# Patient Record
Sex: MALE | Race: WHITE | NOT HISPANIC OR LATINO | Employment: FULL TIME | URBAN - NONMETROPOLITAN AREA
[De-identification: names, ages, dates, MRNs, and addresses within clinical notes are randomized per-mention and may not be internally consistent; named-entity substitution may affect disease eponyms.]

---

## 2019-10-16 ENCOUNTER — TELEPHONE (OUTPATIENT)
Dept: FAMILY MEDICINE CLINIC | Facility: CLINIC | Age: 59
End: 2019-10-16

## 2019-10-16 NOTE — TELEPHONE ENCOUNTER
Called patient to move appt  Dr Gabby Dimas will not be in the office in the afternoon on 10/23  Phone is not in service

## 2019-11-08 DIAGNOSIS — E03.9 ACQUIRED HYPOTHYROIDISM: Primary | ICD-10-CM

## 2019-11-12 RX ORDER — LEVOTHYROXINE SODIUM 0.15 MG/1
TABLET ORAL
Qty: 90 TABLET | Refills: 2 | Status: SHIPPED | OUTPATIENT
Start: 2019-11-12 | End: 2020-07-21

## 2019-11-20 ENCOUNTER — OFFICE VISIT (OUTPATIENT)
Dept: FAMILY MEDICINE CLINIC | Facility: CLINIC | Age: 59
End: 2019-11-20
Payer: COMMERCIAL

## 2019-11-20 VITALS
DIASTOLIC BLOOD PRESSURE: 82 MMHG | BODY MASS INDEX: 33.63 KG/M2 | HEIGHT: 72 IN | OXYGEN SATURATION: 98 % | WEIGHT: 248.3 LBS | HEART RATE: 52 BPM | SYSTOLIC BLOOD PRESSURE: 138 MMHG

## 2019-11-20 DIAGNOSIS — Z12.5 PROSTATE CANCER SCREENING: ICD-10-CM

## 2019-11-20 DIAGNOSIS — E78.5 DYSLIPIDEMIA: ICD-10-CM

## 2019-11-20 DIAGNOSIS — Z79.899 ENCOUNTER FOR LONG-TERM (CURRENT) USE OF OTHER MEDICATIONS: ICD-10-CM

## 2019-11-20 DIAGNOSIS — I10 ESSENTIAL HYPERTENSION, BENIGN: ICD-10-CM

## 2019-11-20 DIAGNOSIS — Z11.59 ENCOUNTER FOR HEPATITIS C SCREENING TEST FOR LOW RISK PATIENT: ICD-10-CM

## 2019-11-20 DIAGNOSIS — E03.9 ACQUIRED HYPOTHYROIDISM: Primary | ICD-10-CM

## 2019-11-20 PROCEDURE — 1036F TOBACCO NON-USER: CPT | Performed by: FAMILY MEDICINE

## 2019-11-20 PROCEDURE — 99213 OFFICE O/P EST LOW 20 MIN: CPT | Performed by: FAMILY MEDICINE

## 2019-11-20 RX ORDER — ATENOLOL 50 MG/1
1 TABLET ORAL DAILY
COMMUNITY
Start: 2019-10-17 | End: 2020-03-06 | Stop reason: SDUPTHER

## 2019-11-20 NOTE — PROGRESS NOTES
Assessment/Plan:    Acquired hypothyroidism  Continue levothyroxine 150 mcg daily  I ordered repeat TSH and T4 to be done 1 week prior to his next visit  I will see the patient back again in 6 months  Essential hypertension, benign  Blood pressure is reasonably well controlled on his current regiment  I asked the patient to try to lose weight  I asked him to follow a low-salt diet  I again asked him to resume his exercise program   He reports no side effects from the atenolol  Prior to that, he had been taking Bystolic but his insurance company would no longer cover that  Diagnoses and all orders for this visit:    Acquired hypothyroidism  -     TSH, 3rd generation; Future  -     T4, free; Future    Essential hypertension, benign  -     Comprehensive metabolic panel; Future    Encounter for hepatitis C screening test for low risk patient  -     Hepatitis C antibody; Future    Prostate cancer screening  -     PSA Total, Diagnostic; Future    Dyslipidemia  -     Lipid panel; Future    Encounter for long-term (current) use of other medications  -     CBC and differential; Future    Other orders  -     atenolol (TENORMIN) 50 mg tablet; Take 1 tablet by mouth daily        BMI Counseling: Body mass index is 33 68 kg/m²  The BMI is above normal  Nutrition recommendations include decreasing portion sizes, encouraging healthy choices of fruits and vegetables, decreasing fast food intake, consuming healthier snacks, limiting drinks that contain sugar and moderation in carbohydrate intake  Exercise recommendations include exercising 3-5 times per week  No pharmacotherapy was ordered  Subjective:      Patient ID: Sonya Cobian is a 66 y o  male  This patient is a 42-year-old white male presents to the office today for his routine checkup  The patient is doing well and has no complaints  He is taking his medication as prescribed  He had been exercising regularly up until recently    He tells me the room with his exercise equipment was just re-done  He plans to resume his exercise program   He struggles with his weight  He thinks he gained a few lb since his last visit  The following portions of the patient's history were reviewed and updated as appropriate: allergies, current medications, past family history, past medical history, past social history, past surgical history and problem list     Review of Systems   Cardiovascular: Negative for chest pain, palpitations and leg swelling  Gastrointestinal: Negative for abdominal distention, blood in stool, constipation, diarrhea, nausea and vomiting  Genitourinary:        Denies nocturia and weak urinary stream         Objective:      /82 (BP Location: Left arm, Patient Position: Sitting, Cuff Size: Large)   Pulse (!) 52   Ht 6' (1 829 m)   Wt 113 kg (248 lb 4 8 oz)   SpO2 98%   BMI 33 68 kg/m²          Physical Exam   Constitutional:   Patient is a pleasant 72-year-old white male who appears his stated age  He is in no distress  HENT:   Head: Normocephalic and atraumatic  Right Ear: External ear normal    Left Ear: External ear normal    Mouth/Throat: Oropharynx is clear and moist  No oropharyngeal exudate  Tympanic membranes are clear   Eyes: Pupils are equal, round, and reactive to light  Conjunctivae are normal  No scleral icterus  Neck: Neck supple  No tracheal deviation present  No thyromegaly present  There are no carotid bruits noted   Cardiovascular: Normal rate, regular rhythm and normal heart sounds  Exam reveals no gallop and no friction rub  No murmur heard  Pulmonary/Chest: Effort normal and breath sounds normal  No stridor  No respiratory distress  He has no wheezes  He has no rales  Abdominal: Soft  Bowel sounds are normal  He exhibits no distension and no mass  There is no tenderness  There is no rebound and no guarding     There is no organomegaly noted   Lymphadenopathy:     He has no cervical adenopathy  Vitals reviewed      extremities:  Without cyanosis, clubbing, or edema

## 2019-11-21 NOTE — ASSESSMENT & PLAN NOTE
Blood pressure is reasonably well controlled on his current regiment  I asked the patient to try to lose weight  I asked him to follow a low-salt diet  I again asked him to resume his exercise program   He reports no side effects from the atenolol  Prior to that, he had been taking Bystolic but his insurance company would no longer cover that

## 2019-11-21 NOTE — ASSESSMENT & PLAN NOTE
Continue levothyroxine 150 mcg daily  I ordered repeat TSH and T4 to be done 1 week prior to his next visit  I will see the patient back again in 6 months

## 2020-03-06 DIAGNOSIS — I10 ESSENTIAL HYPERTENSION, BENIGN: Primary | ICD-10-CM

## 2020-03-06 RX ORDER — ATENOLOL 50 MG/1
50 TABLET ORAL DAILY
Qty: 90 TABLET | Refills: 3 | Status: SHIPPED | OUTPATIENT
Start: 2020-03-06 | End: 2021-03-17

## 2020-05-18 LAB — HCV AB SER-ACNC: NEGATIVE

## 2020-05-27 ENCOUNTER — OFFICE VISIT (OUTPATIENT)
Dept: FAMILY MEDICINE CLINIC | Facility: CLINIC | Age: 60
End: 2020-05-27
Payer: COMMERCIAL

## 2020-05-27 VITALS
TEMPERATURE: 100.1 F | DIASTOLIC BLOOD PRESSURE: 90 MMHG | BODY MASS INDEX: 34.19 KG/M2 | HEIGHT: 70 IN | WEIGHT: 238.8 LBS | SYSTOLIC BLOOD PRESSURE: 140 MMHG | HEART RATE: 54 BPM | OXYGEN SATURATION: 94 %

## 2020-05-27 DIAGNOSIS — I10 ESSENTIAL HYPERTENSION, BENIGN: Primary | ICD-10-CM

## 2020-05-27 DIAGNOSIS — R50.9 FEVER, UNSPECIFIED FEVER CAUSE: ICD-10-CM

## 2020-05-27 DIAGNOSIS — E78.5 DYSLIPIDEMIA: ICD-10-CM

## 2020-05-27 DIAGNOSIS — E03.9 ACQUIRED HYPOTHYROIDISM: ICD-10-CM

## 2020-05-27 PROCEDURE — 99214 OFFICE O/P EST MOD 30 MIN: CPT | Performed by: FAMILY MEDICINE

## 2020-07-20 DIAGNOSIS — E03.9 ACQUIRED HYPOTHYROIDISM: ICD-10-CM

## 2020-07-21 RX ORDER — LEVOTHYROXINE SODIUM 0.15 MG/1
TABLET ORAL
Qty: 90 TABLET | Refills: 2 | Status: SHIPPED | OUTPATIENT
Start: 2020-07-21 | End: 2021-03-25

## 2020-07-24 ENCOUNTER — OFFICE VISIT (OUTPATIENT)
Dept: URGENT CARE | Facility: CLINIC | Age: 60
End: 2020-07-24
Payer: COMMERCIAL

## 2020-07-24 VITALS
HEART RATE: 50 BPM | DIASTOLIC BLOOD PRESSURE: 71 MMHG | RESPIRATION RATE: 18 BRPM | WEIGHT: 228 LBS | TEMPERATURE: 99 F | OXYGEN SATURATION: 95 % | SYSTOLIC BLOOD PRESSURE: 133 MMHG | HEIGHT: 72 IN | BODY MASS INDEX: 30.88 KG/M2

## 2020-07-24 DIAGNOSIS — R06.6 INTRACTABLE HICCUPS: Primary | ICD-10-CM

## 2020-07-24 PROCEDURE — G0382 LEV 3 HOSP TYPE B ED VISIT: HCPCS | Performed by: PHYSICIAN ASSISTANT

## 2020-07-24 RX ORDER — CYCLOBENZAPRINE HCL 10 MG
10 TABLET ORAL ONCE
Status: COMPLETED | OUTPATIENT
Start: 2020-07-24 | End: 2020-07-24

## 2020-07-24 RX ORDER — CYCLOBENZAPRINE HCL 10 MG
10 TABLET ORAL 3 TIMES DAILY PRN
Qty: 30 TABLET | Refills: 0 | Status: SHIPPED | OUTPATIENT
Start: 2020-07-24 | End: 2020-07-25 | Stop reason: ALTCHOICE

## 2020-07-24 RX ADMIN — Medication 10 MG: at 20:44

## 2020-07-25 ENCOUNTER — APPOINTMENT (EMERGENCY)
Dept: RADIOLOGY | Facility: HOSPITAL | Age: 60
End: 2020-07-25
Payer: COMMERCIAL

## 2020-07-25 ENCOUNTER — HOSPITAL ENCOUNTER (EMERGENCY)
Facility: HOSPITAL | Age: 60
Discharge: HOME | End: 2020-07-26
Attending: EMERGENCY MEDICINE | Admitting: EMERGENCY MEDICINE
Payer: COMMERCIAL

## 2020-07-25 DIAGNOSIS — R06.6 INTRACTABLE HICCUPS: Primary | ICD-10-CM

## 2020-07-25 PROCEDURE — 71046 X-RAY EXAM CHEST 2 VIEWS: CPT

## 2020-07-25 PROCEDURE — 99284 EMERGENCY DEPT VISIT MOD MDM: CPT | Performed by: EMERGENCY MEDICINE

## 2020-07-25 PROCEDURE — 93005 ELECTROCARDIOGRAM TRACING: CPT

## 2020-07-25 PROCEDURE — 96374 THER/PROPH/DIAG INJ IV PUSH: CPT

## 2020-07-25 PROCEDURE — 99283 EMERGENCY DEPT VISIT LOW MDM: CPT

## 2020-07-25 RX ORDER — BACLOFEN 5 MG/1
TABLET ORAL
Qty: 10 TABLET | Refills: 0 | Status: SHIPPED | OUTPATIENT
Start: 2020-07-25 | End: 2021-03-17 | Stop reason: ALTCHOICE

## 2020-07-25 RX ORDER — BACLOFEN 10 MG/1
10 TABLET ORAL ONCE
Status: COMPLETED | OUTPATIENT
Start: 2020-07-25 | End: 2020-07-25

## 2020-07-25 RX ORDER — METOCLOPRAMIDE HYDROCHLORIDE 5 MG/ML
10 INJECTION INTRAMUSCULAR; INTRAVENOUS ONCE
Status: COMPLETED | OUTPATIENT
Start: 2020-07-25 | End: 2020-07-25

## 2020-07-25 RX ADMIN — BACLOFEN 10 MG: 10 TABLET ORAL at 23:27

## 2020-07-25 RX ADMIN — METOCLOPRAMIDE HYDROCHLORIDE 10 MG: 5 INJECTION INTRAMUSCULAR; INTRAVENOUS at 22:14

## 2020-07-25 NOTE — PATIENT INSTRUCTIONS
Muscle relaxer as needed  Do not take muscle relaxer if you're going to be driving and do not take with alcohol  Try taking pepcid over the counter  Follow up with pcp if not improving

## 2020-07-25 NOTE — PROGRESS NOTES
[facility]    NAME: Ramona Quispe is a 64 y o  male  : 1960    MRN: [id number]  DATE: 2020  TIME: 8:37 PM    Assessment and Plan   Intractable hiccups [R06 6]  1  Intractable hiccups  cyclobenzaprine (FLEXERIL) 10 mg tablet    cyclobenzaprine (FLEXERIL) tablet 10 mg       Patient Instructions     Patient Instructions   Muscle relaxer as needed  Do not take muscle relaxer if you're going to be driving and do not take with alcohol  Try taking pepcid over the counter  Follow up with pcp if not improving  Chief Complaint     Chief Complaint   Patient presents with    Hiccups     x1  This is the first time this has happened  History of Present Illness   61-year-old male here with complaint of intractable hiccups for the last 24 hours  Had difficulty sleeping last night due to the hiccups  States that he does have some mild indigestion and belching as well  No cough  No abdominal pain  Review of Systems   Review of Systems   Constitutional: Negative for chills, fatigue and fever  HENT: Negative for congestion, ear pain, postnasal drip, sinus pressure and sore throat  Respiratory: Negative for cough, shortness of breath and wheezing  Gastrointestinal:        Intractable hiccups   Neurological: Negative for headaches  All other systems reviewed and are negative        Current Medications     Current Outpatient Medications:     atenolol (TENORMIN) 50 mg tablet, Take 1 tablet (50 mg total) by mouth daily, Disp: 90 tablet, Rfl: 3    levothyroxine 150 mcg tablet, TAKE 1 TABLET BY MOUTH  EVERY DAY, Disp: 90 tablet, Rfl: 2    cyclobenzaprine (FLEXERIL) 10 mg tablet, Take 1 tablet (10 mg total) by mouth 3 (three) times a day as needed (hiccups), Disp: 30 tablet, Rfl: 0    Current Facility-Administered Medications:     cyclobenzaprine (FLEXERIL) tablet 10 mg, 10 mg, Oral, Once, Philip Wilson PA-C    Current Allergies     Allergies as of 2020    (No Known Allergies)          The following portions of the patient's history were reviewed and updated as appropriate: allergies, current medications, past family history, past medical history, past social history, past surgical history and problem list    Past Medical History:   Diagnosis Date    Allergic     seasonal    Disease of thyroid gland     Hypertension      History reviewed  No pertinent surgical history  History reviewed  No pertinent family history  Social History     Socioeconomic History    Marital status: /Civil Union     Spouse name: Not on file    Number of children: Not on file    Years of education: Not on file    Highest education level: Not on file   Occupational History    Not on file   Social Needs    Financial resource strain: Not on file    Food insecurity:     Worry: Not on file     Inability: Not on file    Transportation needs:     Medical: Not on file     Non-medical: Not on file   Tobacco Use    Smoking status: Never Smoker    Smokeless tobacco: Never Used   Substance and Sexual Activity    Alcohol use: Not Currently    Drug use: Never    Sexual activity: Not on file   Lifestyle    Physical activity:     Days per week: Not on file     Minutes per session: Not on file    Stress: Not on file   Relationships    Social connections:     Talks on phone: Not on file     Gets together: Not on file     Attends Congregational service: Not on file     Active member of club or organization: Not on file     Attends meetings of clubs or organizations: Not on file     Relationship status: Not on file    Intimate partner violence:     Fear of current or ex partner: Not on file     Emotionally abused: Not on file     Physically abused: Not on file     Forced sexual activity: Not on file   Other Topics Concern    Not on file   Social History Narrative    Not on file     Medications have been verified      Objective   /71   Pulse (!) 50   Temp 99 °F (37 2 °C) (Temporal)   Resp 18 Ht 6' (1 829 m)   Wt 103 kg (228 lb)   SpO2 95%   BMI 30 92 kg/m²      Physical Exam   Physical Exam   Constitutional: He appears well-developed and well-nourished  No distress  HENT:   Head: Normocephalic and atraumatic  Right Ear: Tympanic membrane normal    Left Ear: Tympanic membrane normal    Nose: No mucosal edema  Mouth/Throat: Oropharynx is clear and moist    Neck: Normal range of motion  Cardiovascular: Normal rate, regular rhythm and normal heart sounds  Pulmonary/Chest: Effort normal and breath sounds normal  No respiratory distress  Nursing note and vitals reviewed

## 2020-07-26 VITALS
RESPIRATION RATE: 18 BRPM | OXYGEN SATURATION: 95 % | BODY MASS INDEX: 30.48 KG/M2 | TEMPERATURE: 97.9 F | WEIGHT: 225 LBS | HEIGHT: 72 IN | SYSTOLIC BLOOD PRESSURE: 121 MMHG | DIASTOLIC BLOOD PRESSURE: 67 MMHG | HEART RATE: 51 BPM

## 2020-07-26 NOTE — ED PROCEDURE NOTE
PROCEDURE  ECG 12 Lead Documentation Only  Date/Time: 7/25/2020 11:29 PM  Performed by: Cristy Avalos MD  Authorized by: Cristy Avalos MD     Indications / Diagnosis:  Hiccups  ECG reviewed by me, the ED Provider: yes    Patient location:  ED  Previous ECG:     Comparison to cardiac monitor: Yes    Interpretation:     Interpretation: non-specific    Rate:     ECG rate:  43    ECG rate assessment: bradycardic    Rhythm:     Rhythm: sinus bradycardia    Ectopy:     Ectopy: none    QRS:     QRS axis:  Normal    QRS intervals:  Normal  Conduction:     Conduction: normal    ST segments:     ST segments:  Normal  T waves:     T waves: normal           Cristy Avalos MD  07/26/20 0154

## 2020-07-26 NOTE — ED NOTES
Pt  States has had hiccups for the past 3 days  Seen at Urgent Care yesterday for same and placed on Cyclobenzapr without relief  States of no improvement with medication       Jun Bansal RN  07/25/20 2036

## 2020-07-27 LAB
ATRIAL RATE: 43 BPM
P AXIS: 5 DEGREES
PR INTERVAL: 152 MS
QRS AXIS: 35 DEGREES
QRSD INTERVAL: 94 MS
QT INTERVAL: 462 MS
QTC INTERVAL: 390 MS
T WAVE AXIS: 10 DEGREES
VENTRICULAR RATE: 43 BPM

## 2020-07-27 PROCEDURE — 93010 ELECTROCARDIOGRAM REPORT: CPT | Performed by: INTERNAL MEDICINE

## 2020-07-28 ENCOUNTER — OFFICE VISIT (OUTPATIENT)
Dept: FAMILY MEDICINE CLINIC | Facility: CLINIC | Age: 60
End: 2020-07-28
Payer: COMMERCIAL

## 2020-07-28 VITALS
WEIGHT: 227.2 LBS | BODY MASS INDEX: 31.81 KG/M2 | SYSTOLIC BLOOD PRESSURE: 128 MMHG | HEART RATE: 43 BPM | DIASTOLIC BLOOD PRESSURE: 72 MMHG | TEMPERATURE: 98.1 F | OXYGEN SATURATION: 96 % | HEIGHT: 71 IN

## 2020-07-28 DIAGNOSIS — R06.6 HICCUPS: Primary | ICD-10-CM

## 2020-07-28 PROCEDURE — 3078F DIAST BP <80 MM HG: CPT | Performed by: FAMILY MEDICINE

## 2020-07-28 PROCEDURE — 3074F SYST BP LT 130 MM HG: CPT | Performed by: FAMILY MEDICINE

## 2020-07-28 PROCEDURE — 1036F TOBACCO NON-USER: CPT | Performed by: FAMILY MEDICINE

## 2020-07-28 PROCEDURE — 3008F BODY MASS INDEX DOCD: CPT | Performed by: FAMILY MEDICINE

## 2020-07-28 PROCEDURE — 99213 OFFICE O/P EST LOW 20 MIN: CPT | Performed by: FAMILY MEDICINE

## 2020-07-28 RX ORDER — CYCLOBENZAPRINE HCL 10 MG
TABLET ORAL
COMMUNITY
Start: 2020-07-25 | End: 2020-07-28 | Stop reason: HOSPADM

## 2020-07-28 RX ORDER — CHLORPROMAZINE HYDROCHLORIDE 10 MG/1
10 TABLET, FILM COATED ORAL 3 TIMES DAILY
Qty: 30 TABLET | Refills: 2 | Status: SHIPPED | OUTPATIENT
Start: 2020-07-28 | End: 2021-03-17 | Stop reason: ALTCHOICE

## 2020-07-28 NOTE — PATIENT INSTRUCTIONS
Hiccups   WHAT YOU NEED TO KNOW:   What are hiccups? Hiccups are repeated spasms of the diaphragm  The diaphragm is a muscle that helps you breathe  It is located between your chest and abdomen  What causes hiccups? Hiccups often occur when the nerve that controls the diaphragm becomes irritated  The following commonly cause hiccups:  · Food eaten too fast, or a full stomach    · Alcohol, or carbonated liquids such as soda    · Emotional excitement, such as feeling nervous or stressed    · Medical conditions, such as diabetes or appendicitis    · Certain medicines, such as steroids or antianxiety medicines  How are hiccups treated? Hiccups usually go away on their own within a few minutes  You may need medicine or other treatments if your hiccups are caused by another medical condition  The following home treatments may help stop your hiccups:  · Hold your breath and silently count to 10  · Drink a large glass of water, sip ice water, or gargle with water  · Suck on a piece of hard candy  · Swallow a spoonful of sugar or peanut butter  · Breathe into a paper bag  · Have another person try to scare you  What are the risks of hiccups? Your hiccups may last longer than 48 hours, or they may come back frequently  If you have hiccups for more than 48 hours, you may have an underlying medical condition  This may include nerve damage, gastric reflux, or a tumor  You may have vomiting, chest discomfort, and tiredness with severe hiccups  Your hiccups may interfere with your ability to sleep, eat, or breathe  This can cause exhaustion, weight loss, or insomnia  When should I contact my healthcare provider? · Your hiccups last longer than 48 hours, or they keep coming back  · Your hiccups interfere with your ability to sleep, eat, or breathe  · Your hiccups cause pain  · You have questions or concerns about your condition or care  CARE AGREEMENT:   You have the right to help plan your care  Learn about your health condition and how it may be treated  Discuss treatment options with your caregivers to decide what care you want to receive  You always have the right to refuse treatment  The above information is an  only  It is not intended as medical advice for individual conditions or treatments  Talk to your doctor, nurse or pharmacist before following any medical regimen to see if it is safe and effective for you  © 2017 [employer] Information is for End User's use only and may not be sold, redistributed or otherwise used for commercial purposes  All illustrations and images included in CareNotes® are the copyrighted property of A D [employer] M [employer]  or Javon [employer]iscoll

## 2020-07-28 NOTE — PROGRESS NOTES
Assessment/Plan:    Hiccups  I reviewed the records from the urgent care center as well as records from the ER  I was glad to see a chest x-ray was done  This essentially rules out any compression of the recurrent laryngeal nerve by a tumor  I am going to start the patient on Thorazine 10 mg 3 times per day  Patient will follow-up with me if no improvement or worsens  He has an upcoming vacation scheduled to [facility]  We discussed safety precautions and I advised him not to go  Diagnoses and all orders for this visit:    Hiccups  -     chlorproMAZINE (THORAZINE) 10 mg tablet; Take 1 tablet (10 mg total) by mouth 3 (three) times a day    Other orders  -     Discontinue: cyclobenzaprine (FLEXERIL) 10 mg tablet; TAKE 1 TABLET BY MOUTH THREE TIMES DAILY AS NEEDED FOR HICCUPS        BMI Counseling: Body mass index is 32 14 kg/m²  The BMI is above normal  Nutrition recommendations include reducing portion sizes, decreasing overall calorie intake, 3-5 servings of fruits/vegetables daily, reducing fast food intake, consuming healthier snacks, decreasing soda and/or juice intake and moderation in carbohydrate intake  Exercise recommendations include exercising 3-5 times per week  Subjective:      Patient ID: David Gifford is a 64 y o  male  This patient is a 80-year-old white male presents to the office today complaining of severe intractable hiccups  His symptoms began 5 days ago  He started with severe hiccups  Now, he has frequent belching as well  He went to the urgent care center on July 24 and was prescribed cyclobenzaprine  This did not work at all  The following day, he went to the emergency department  He received 1 dose of oral baclofen, 1 dose of intravenous metoclopramide, and he was discharged on oral baclofen  He tells me it felt like the oral baclofen would help for a short while after taking it but then he would start hiccuping again    He tells me his hiccups are so severe that he has been up all night and unable to sleep  He was unable to work as a result  The following portions of the patient's history were reviewed and updated as appropriate: allergies, current medications, past family history, past medical history, past social history, past surgical history and problem list     Review of Systems   Respiratory: Negative for cough, shortness of breath and wheezing  Cardiovascular: Negative for chest pain, palpitations and leg swelling  Objective:      /72 (BP Location: Left arm, Patient Position: Sitting, Cuff Size: Large)   Pulse (!) 43   Temp 98 1 °F (36 7 °C) (Temporal)   Ht 5' 10 5" (1 791 m)   Wt 103 kg (227 lb 3 2 oz)   SpO2 96%   BMI 32 14 kg/m²          Physical Exam   Constitutional:   This is a 61-year-old white male who appears his stated age  He appears to be quite uncomfortable with severe intractable hiccups     He had difficulty speaking as a result  HENT:   Head: Normocephalic and atraumatic  Right Ear: External ear normal    Left Ear: External ear normal    Mouth/Throat: Oropharynx is clear and moist  No oropharyngeal exudate  Eyes: Pupils are equal, round, and reactive to light  Conjunctivae are normal  Right eye exhibits no discharge  Left eye exhibits no discharge  No scleral icterus  Neck: Neck supple  No tracheal deviation present  No thyromegaly present  No supraclavicular lymphadenopathy is present   Cardiovascular: Normal rate, regular rhythm and normal heart sounds  Exam reveals no gallop and no friction rub  No murmur heard  Pulmonary/Chest: Effort normal and breath sounds normal  No stridor  No respiratory distress  He has no wheezes  He has no rales  Lymphadenopathy:     He has no cervical adenopathy  Vitals reviewed

## 2020-07-28 NOTE — ASSESSMENT & PLAN NOTE
I reviewed the records from the urgent care center as well as records from the ER  I was glad to see a chest x-ray was done  This essentially rules out any compression of the recurrent laryngeal nerve by a tumor  I am going to start the patient on Thorazine 10 mg 3 times per day  Patient will follow-up with me if no improvement or worsens  He has an upcoming vacation scheduled to [facility]  We discussed safety precautions and I advised him not to go

## 2020-07-29 ENCOUNTER — TELEPHONE (OUTPATIENT)
Dept: FAMILY MEDICINE CLINIC | Facility: CLINIC | Age: 60
End: 2020-07-29

## 2020-07-29 NOTE — TELEPHONE ENCOUNTER
Patient started medication today at [facility] pharmacy didn't have it  Do you want him to stay out tomorrow to get the full dose to see if there is any side effects and how long will this take to show results  So far no dizziness  He only took one dose so far

## 2020-07-30 NOTE — TELEPHONE ENCOUNTER
Spoke with patient at night is seems to calm down but in the day it seems to be the same  Later in the day it gets worse  Slight drowsiness  Is it safe to go back tomorrow or wait?

## 2020-12-02 ENCOUNTER — TELEMEDICINE (OUTPATIENT)
Dept: FAMILY MEDICINE CLINIC | Facility: CLINIC | Age: 60
End: 2020-12-02
Payer: COMMERCIAL

## 2020-12-02 VITALS — WEIGHT: 221 LBS | BODY MASS INDEX: 30.94 KG/M2 | HEIGHT: 71 IN | TEMPERATURE: 98.8 F

## 2020-12-02 DIAGNOSIS — Z20.822 EXPOSURE TO COVID-19 VIRUS: Primary | ICD-10-CM

## 2020-12-02 PROCEDURE — 3008F BODY MASS INDEX DOCD: CPT | Performed by: FAMILY MEDICINE

## 2020-12-02 PROCEDURE — 99212 OFFICE O/P EST SF 10 MIN: CPT | Performed by: FAMILY MEDICINE

## 2020-12-02 PROCEDURE — 1036F TOBACCO NON-USER: CPT | Performed by: FAMILY MEDICINE

## 2020-12-03 PROCEDURE — U0003 INFECTIOUS AGENT DETECTION BY NUCLEIC ACID (DNA OR RNA); SEVERE ACUTE RESPIRATORY SYNDROME CORONAVIRUS 2 (SARS-COV-2) (CORONAVIRUS DISEASE [COVID-19]), AMPLIFIED PROBE TECHNIQUE, MAKING USE OF HIGH THROUGHPUT TECHNOLOGIES AS DESCRIBED BY CMS-2020-01-R: HCPCS | Performed by: FAMILY MEDICINE

## 2020-12-04 LAB — SARS-COV-2 RNA SPEC QL NAA+PROBE: NOT DETECTED

## 2021-03-17 ENCOUNTER — OFFICE VISIT (OUTPATIENT)
Dept: FAMILY MEDICINE CLINIC | Facility: CLINIC | Age: 61
End: 2021-03-17
Payer: COMMERCIAL

## 2021-03-17 VITALS
WEIGHT: 228.2 LBS | TEMPERATURE: 97.9 F | HEART RATE: 57 BPM | DIASTOLIC BLOOD PRESSURE: 76 MMHG | OXYGEN SATURATION: 95 % | HEIGHT: 71 IN | BODY MASS INDEX: 31.95 KG/M2 | SYSTOLIC BLOOD PRESSURE: 128 MMHG

## 2021-03-17 DIAGNOSIS — Z00.00 HEALTH MAINTENANCE EXAMINATION: Primary | ICD-10-CM

## 2021-03-17 DIAGNOSIS — Z12.11 SCREENING FOR MALIGNANT NEOPLASM OF COLON: ICD-10-CM

## 2021-03-17 DIAGNOSIS — E78.5 DYSLIPIDEMIA: ICD-10-CM

## 2021-03-17 DIAGNOSIS — Z12.5 PROSTATE CANCER SCREENING: ICD-10-CM

## 2021-03-17 DIAGNOSIS — I10 ESSENTIAL HYPERTENSION, BENIGN: ICD-10-CM

## 2021-03-17 DIAGNOSIS — E03.9 ACQUIRED HYPOTHYROIDISM: ICD-10-CM

## 2021-03-17 PROCEDURE — 3008F BODY MASS INDEX DOCD: CPT | Performed by: FAMILY MEDICINE

## 2021-03-17 PROCEDURE — 99396 PREV VISIT EST AGE 40-64: CPT | Performed by: FAMILY MEDICINE

## 2021-03-17 PROCEDURE — 3078F DIAST BP <80 MM HG: CPT | Performed by: FAMILY MEDICINE

## 2021-03-17 PROCEDURE — 3074F SYST BP LT 130 MM HG: CPT | Performed by: FAMILY MEDICINE

## 2021-03-17 PROCEDURE — 1036F TOBACCO NON-USER: CPT | Performed by: FAMILY MEDICINE

## 2021-03-17 RX ORDER — ATENOLOL 50 MG/1
25 TABLET ORAL DAILY
Qty: 90 TABLET | Refills: 3
Start: 2021-03-17 | End: 2021-09-16

## 2021-03-17 NOTE — PROGRESS NOTES
Assessment/Plan:    Health maintenance examination    Patient presents to the office today for his annual physical   Patient's hiccups went away last summer, with the baclofen  He is trying to exercise  He struggles with his weight  I encouraged him to continue to try to lose weight  I recheck his blood pressure myself and found to be 136/70  His blood pressure is doing well on atenolol 25 mg daily  I ordered repeat labs which I would like him to have done in April  We will include thyroid function test   He will continue levothyroxine 50 mcg daily  We discussed a screening colonoscopy  He would prefer to do a Cologuard  This was ordered  He will follow-up with me in 6 months  Diagnoses and all orders for this visit:    Health maintenance examination    Screening for malignant neoplasm of colon  -     Cologuard; Future    Essential hypertension, benign  -     atenolol (TENORMIN) 50 mg tablet; Take 0 5 tablets (25 mg total) by mouth daily    Prostate cancer screening  -     PSA, Total Screen; Future    Dyslipidemia  -     Comprehensive metabolic panel; Future  -     Lipid panel; Future    Acquired hypothyroidism  -     TSH, 3rd generation; Future  -     T4, free; Future    Other orders  -     Cancel: Hepatitis C antibody; Future          Subjective:      Patient ID: Lesli Blair is a 64 y o  male  This patient is a 61-year-old white male who presents to the office today for his annual physical   The patient is doing well and has no complaints  He is walking regularly for exercise  He is trying to lose weight but finds it difficult  He is currently taking 1/2 tablet of atenolol, rather than a whole tablet  We had changed that last visit because of bradycardia  He remains on levothyroxine        The following portions of the patient's history were reviewed and updated as appropriate: allergies, current medications, past family history, past medical history, past social history, past surgical history and problem list     Review of Systems   Constitutional: Negative for activity change, appetite change and unexpected weight change  Respiratory: Negative for cough, shortness of breath and wheezing  Cardiovascular: Negative for chest pain, palpitations and leg swelling  Gastrointestinal: Negative for abdominal distention, abdominal pain, blood in stool, constipation, diarrhea and nausea  Genitourinary:        Denies nocturia and weak urinary stream         Objective:      /76 (BP Location: Left arm, Patient Position: Sitting, Cuff Size: Large)   Pulse 57   Temp 97 9 °F (36 6 °C) (Temporal)   Ht 5' 10 5" (1 791 m)   Wt 104 kg (228 lb 3 2 oz)   SpO2 95%   BMI 32 28 kg/m²          Physical Exam  Vitals signs reviewed  HENT:      Head: Normocephalic and atraumatic  Right Ear: Tympanic membrane, ear canal and external ear normal  There is no impacted cerumen  Left Ear: Tympanic membrane, ear canal and external ear normal  There is no impacted cerumen  Mouth/Throat:      Mouth: Mucous membranes are moist       Pharynx: Oropharynx is clear  No oropharyngeal exudate or posterior oropharyngeal erythema  Eyes:      General: No scleral icterus  Right eye: No discharge  Left eye: No discharge  Conjunctiva/sclera: Conjunctivae normal       Pupils: Pupils are equal, round, and reactive to light  Neck:      Musculoskeletal: Neck supple  Vascular: No carotid bruit  Comments: No thyromegaly was noted  Cardiovascular:      Rate and Rhythm: Normal rate and regular rhythm  Heart sounds: Normal heart sounds  No murmur  No friction rub  No gallop  Pulmonary:      Effort: Pulmonary effort is normal  No respiratory distress  Breath sounds: Normal breath sounds  No stridor  No wheezing, rhonchi or rales  Abdominal:      General: Bowel sounds are normal  There is no distension  Palpations: Abdomen is soft  There is no mass  Tenderness: There is no abdominal tenderness  There is no guarding  Comments: There is no hepatosplenomegaly   Lymphadenopathy:      Cervical: No cervical adenopathy  Psychiatric:         Mood and Affect: Mood normal          Behavior: Behavior normal          Thought Content:  Thought content normal          Judgment: Judgment normal          Extremities: Without cyanosis, clubbing, or edema

## 2021-03-17 NOTE — PATIENT INSTRUCTIONS
Low-Sodium Diet   AMBULATORY CARE:   A low-sodium diet  limits foods that are high in sodium (salt)  You will need to follow a low-sodium diet if you have high blood pressure, kidney disease, or heart failure  You may also need to follow this diet if you have a condition that is causing your body to retain (hold) extra fluid  You may need to limit the amount of sodium you eat in a day to 1,500 to 2,000 mg  Ask your healthcare provider how much sodium you can have each day  How to use food labels to choose foods that are low in sodium:  Read food labels to find the amount of sodium they contain  The amount of sodium is listed in milligrams (mg)  The % Daily Value (DV) column tells you how much of your daily needs are met by 1 serving of the food for each nutrient listed  Choose foods that have less than 5% of the DV of sodium  These foods are considered low in sodium  Foods that have 20% or more of the DV of sodium are considered high in sodium  Some food labels may also list any of the following terms that tell you about the sodium content in the food:  · Sodium-free:  Less than 5 mg in each serving    · Very low sodium:  35 mg of sodium or less in each serving    · Low sodium:  140 mg of sodium or less in each serving    · Reduced sodium: At least 25% less sodium in each serving than the regular type    · Light in sodium:  50% less sodium in each serving    · Unsalted or no added salt:  No extra salt is added during processing (the food may still contain sodium)     Foods to avoid:  Salty foods are high in sodium  You should avoid the following:  · Processed foods:      ? Mixes for cornbread, biscuits, cake, and pudding     ? Instant foods, such as potatoes, cereals, noodles, and rice     ? Packaged foods, such as bread stuffing, rice and pasta mixes, snack dip mixes, and macaroni and cheese     ? Canned foods, such as canned vegetables, soups, broths, sauces, and vegetable or tomato juice    ?  Snack foods, such as salted chips, popcorn, pretzels, pork rinds, salted crackers, and salted nuts    ? Frozen foods, such as dinners, entrees, vegetables with sauces, and breaded meats    ? Sauerkraut, pickled vegetables, and other foods prepared in brine    · Meats and cheeses:      ? Smoked or cured meat, such as corned beef, gamboa, ham, hot dogs, and sausage    ? Canned meats or spreads, such as potted meats, sardines, anchovies, and imitation seafood    ? Deli or lunch meats, such as bologna, ham, turkey, and roast beef    ? Processed cheese, such as American cheese and cheese spreads    · Condiments, sauces, and seasonings:      ? Salt (¼ teaspoon of salt contains 575 mg of sodium)    ? Seasonings made with salt, such as garlic salt, celery salt, onion salt, and seasoned salt    ? Regular soy sauce, barbecue sauce, teriyaki sauce, steak sauce, Worcestershire sauce, and most flavored vinegars    ? Canned gravy and mixes     ? Regular condiments, such as mustard, ketchup, and salad dressings    ? Pickles and olives    ? Meat tenderizers and monosodium glutamate (MSG)    Foods to include:  Read the food label to find the exact amount of sodium in each serving  · Bread and cereal:  Try to choose breads with less than 80 mg of sodium per serving  ? Bread, roll, francisco javier, tortilla, or unsalted crackers  ? Ready-to-eat cereals with less than 5% DV of sodium (examples include shredded wheat and puffed rice)    ? Pasta    · Vegetables and fruits:      ? Unsalted fresh, frozen, or canned vegetables    ? Fresh, frozen, or canned fruits    ? Fruit juice    · Dairy:  One serving has about 150 mg of sodium  ? Milk, all types    ? Yogurt    ? Hard cheese, such as cheddar, Swiss, [employer], or mozzarella    · Meat and other protein foods:  Some raw meats may have added sodium  ? Plain meats, fish, and poultry     ? Eggs    · Other foods:      ? Homemade pudding    ? Unsalted nuts, popcorn, or pretzels    ?  Unsalted butter or margarine    Ways to decrease sodium:   · Add spices and herbs to foods instead of salt during cooking  Use salt-free seasonings to add flavor to foods  Examples include onion powder, garlic powder, basil, vásquez powder, paprika, and parsley  Try lemon or lime juice or vinegar to give foods a tart flavor  Use hot peppers, pepper, or cayenne pepper to add a spicy flavor  · Do not keep a salt shaker at your kitchen table  This may help keep you from adding salt to food at the table  A teaspoon of salt has 2,300 mg of sodium  It may take time to get used to enjoying the natural flavor of food instead of adding salt  Talk to your healthcare provider before you use salt substitutes  Some salt substitutes have a high amount of potassium and need to be avoided if you have kidney disease  · Choose low-sodium foods at restaurants  Meals from restaurants are often high in sodium  Some restaurants have nutrition information on the menu that tells you the amount of sodium in their foods  If possible, ask for your food to be prepared with less, or no salt  · Shop for unsalted or low-sodium foods and snacks at the grocery store  Examples include unsalted or low-sodium broths, soups, and canned vegetables  Choose fresh or frozen vegetables instead  Choose unsalted nuts or seeds or fresh fruits or vegetables as snacks  Read food labels and choose salt-free, very low-sodium, or low-sodium foods  © Copyright [address] Information is for End User's use only and may not be sold, redistributed or otherwise used for commercial purposes  All illustrations and images included in CareNotes® are the copyrighted property of A D A M  Inc  or [facility] Irwin Fierro   The above information is an  only  It is not intended as medical advice for individual conditions or treatments  Talk to your doctor, nurse or pharmacist before following any medical regimen to see if it is safe and effective for you

## 2021-03-17 NOTE — ASSESSMENT & PLAN NOTE
Patient presents to the office today for his annual physical   Patient's hiccups went away last summer, with the baclofen  He is trying to exercise  He struggles with his weight  I encouraged him to continue to try to lose weight  I recheck his blood pressure myself and found to be 136/70  His blood pressure is doing well on atenolol 25 mg daily  I ordered repeat labs which I would like him to have done in April  We will include thyroid function test   He will continue levothyroxine 50 mcg daily  We discussed a screening colonoscopy  He would prefer to do a Cologuard  This was ordered  He will follow-up with me in 6 months

## 2021-03-18 ENCOUNTER — TELEPHONE (OUTPATIENT)
Dept: ADMINISTRATIVE | Facility: OTHER | Age: 61
End: 2021-03-18

## 2021-03-18 NOTE — TELEPHONE ENCOUNTER
Upon review of the In Basket request we were able to locate, review, and update the patient chart as requested for Hepatitis C   Any additional questions or concerns should be emailed to the Practice Liaisons via [email or web address] email, please do not reply via In Basket      Thank you  Odalys Aguila

## 2021-03-18 NOTE — TELEPHONE ENCOUNTER
----- Message from Yovanny Munoz sent at 3/18/2021  7:58 AM EDT -----  Regarding: CARE GAP REQUEST  03/18/21 7:58 AM    Hello, our patient No patient name on file  has had Hepatitis C completed/performed  Please assist in updating the patient chart by pulling the document from the Media Tab  The date of service is 05/20/2021       Thank you,  Cristy Nation MA   Jose M

## 2021-03-24 DIAGNOSIS — E03.9 ACQUIRED HYPOTHYROIDISM: ICD-10-CM

## 2021-03-25 RX ORDER — LEVOTHYROXINE SODIUM 0.15 MG/1
TABLET ORAL
Qty: 90 TABLET | Refills: 3 | Status: SHIPPED | OUTPATIENT
Start: 2021-03-25 | End: 2022-07-27 | Stop reason: SDUPTHER

## 2021-04-05 DIAGNOSIS — Z23 ENCOUNTER FOR IMMUNIZATION: ICD-10-CM

## 2021-04-08 ENCOUNTER — IMMUNIZATIONS (OUTPATIENT)
Dept: FAMILY MEDICINE CLINIC | Facility: HOSPITAL | Age: 61
End: 2021-04-08

## 2021-04-08 DIAGNOSIS — Z23 ENCOUNTER FOR IMMUNIZATION: Primary | ICD-10-CM

## 2021-04-08 PROCEDURE — 0011A SARS-COV-2 / COVID-19 MRNA VACCINE (MODERNA) 100 MCG: CPT

## 2021-04-08 PROCEDURE — 91301 SARS-COV-2 / COVID-19 MRNA VACCINE (MODERNA) 100 MCG: CPT

## 2021-04-15 ENCOUNTER — TELEPHONE (OUTPATIENT)
Dept: FAMILY MEDICINE CLINIC | Facility: CLINIC | Age: 61
End: 2021-04-15

## 2021-05-10 ENCOUNTER — IMMUNIZATIONS (OUTPATIENT)
Dept: FAMILY MEDICINE CLINIC | Facility: HOSPITAL | Age: 61
End: 2021-05-10

## 2021-05-10 DIAGNOSIS — Z23 ENCOUNTER FOR IMMUNIZATION: Primary | ICD-10-CM

## 2021-05-10 PROCEDURE — 0012A SARS-COV-2 / COVID-19 MRNA VACCINE (MODERNA) 100 MCG: CPT

## 2021-05-10 PROCEDURE — 91301 SARS-COV-2 / COVID-19 MRNA VACCINE (MODERNA) 100 MCG: CPT

## 2021-09-16 DIAGNOSIS — I10 ESSENTIAL HYPERTENSION, BENIGN: ICD-10-CM

## 2021-09-16 RX ORDER — ATENOLOL 50 MG/1
TABLET ORAL
Qty: 90 TABLET | Refills: 3 | Status: SHIPPED | OUTPATIENT
Start: 2021-09-16 | End: 2021-10-27 | Stop reason: HOSPADM

## 2021-10-27 ENCOUNTER — OFFICE VISIT (OUTPATIENT)
Dept: FAMILY MEDICINE CLINIC | Facility: CLINIC | Age: 61
End: 2021-10-27
Payer: COMMERCIAL

## 2021-10-27 VITALS
DIASTOLIC BLOOD PRESSURE: 90 MMHG | BODY MASS INDEX: 32.56 KG/M2 | SYSTOLIC BLOOD PRESSURE: 126 MMHG | WEIGHT: 232.6 LBS | TEMPERATURE: 98.9 F | HEIGHT: 71 IN | OXYGEN SATURATION: 98 % | HEART RATE: 49 BPM

## 2021-10-27 DIAGNOSIS — Z23 ENCOUNTER FOR IMMUNIZATION: ICD-10-CM

## 2021-10-27 DIAGNOSIS — I10 ESSENTIAL HYPERTENSION, BENIGN: Primary | ICD-10-CM

## 2021-10-27 PROCEDURE — 3080F DIAST BP >= 90 MM HG: CPT | Performed by: FAMILY MEDICINE

## 2021-10-27 PROCEDURE — 90682 RIV4 VACC RECOMBINANT DNA IM: CPT | Performed by: FAMILY MEDICINE

## 2021-10-27 PROCEDURE — 1036F TOBACCO NON-USER: CPT | Performed by: FAMILY MEDICINE

## 2021-10-27 PROCEDURE — 3725F SCREEN DEPRESSION PERFORMED: CPT | Performed by: FAMILY MEDICINE

## 2021-10-27 PROCEDURE — 3008F BODY MASS INDEX DOCD: CPT | Performed by: FAMILY MEDICINE

## 2021-10-27 PROCEDURE — 90471 IMMUNIZATION ADMIN: CPT | Performed by: FAMILY MEDICINE

## 2021-10-27 PROCEDURE — 99214 OFFICE O/P EST MOD 30 MIN: CPT | Performed by: FAMILY MEDICINE

## 2021-10-27 PROCEDURE — 3074F SYST BP LT 130 MM HG: CPT | Performed by: FAMILY MEDICINE

## 2021-10-27 RX ORDER — LISINOPRIL 20 MG/1
20 TABLET ORAL DAILY
Qty: 90 TABLET | Refills: 3 | Status: SHIPPED | OUTPATIENT
Start: 2021-10-27 | End: 2022-07-27 | Stop reason: SDUPTHER

## 2021-12-24 NOTE — ED PROVIDER NOTES
History  Chief Complaint   Patient presents with    Hiccups     Patient: Kristel Raza  51 y o /male  YOB: 1960  MRN: [id number]  PCP: Herberth Palm DO  Date of evaluation: 7/25/2020    (N B   Voice-recognition software may have been used in the preparation of this document  Occasional wrong word or "sound-alike" substitutions may have occurred due to the inherent limitations of voice recognition software  Interpretation should be guided by context )    Chief complaint:  Intractable hiccups  On Thursday the patient started to hiccus  Called his PCP but was unable to be seen  He was seen at an urgent care yesterday prescribed cyclobenzaprine  Took 3 doses but has had no thus far  The hiccups are interfering with his ability to sleep      History provided by:  Patient      Prior to Admission Medications   Prescriptions Last Dose Informant Patient Reported? Taking?   atenolol (TENORMIN) 50 mg tablet  Self No No   Sig: Take 1 tablet (50 mg total) by mouth daily   levothyroxine 150 mcg tablet   No Yes   Sig: TAKE 1 TABLET BY MOUTH  EVERY DAY      Facility-Administered Medications Last Administration Doses Remaining   cyclobenzaprine (FLEXERIL) tablet 10 mg 7/24/2020  8:44 PM 0          Past Medical History:   Diagnosis Date    Allergic     seasonal    Disease of thyroid gland     Hypertension        History reviewed  No pertinent surgical history  History reviewed  No pertinent family history  I have reviewed and agree with the history as documented  E-Cigarette/Vaping    E-Cigarette Use Never User      E-Cigarette/Vaping Substances    Nicotine No     THC No     CBD No     Flavoring No     Other No     Unknown No      Social History     Tobacco Use    Smoking status: Never Smoker    Smokeless tobacco: Never Used   Substance Use Topics    Alcohol use: Not Currently    Drug use: Never       Review of Systems   Constitutional: Negative for chills and fever     Respiratory: Negative for cough and shortness of breath  Cardiovascular: Negative for chest pain and palpitations  Gastrointestinal: Negative for abdominal pain and vomiting  Psychiatric/Behavioral: Negative for behavioral problems and confusion  Physical Exam  Physical Exam   Constitutional: He appears well-developed and well-nourished  HENT:   Head: Normocephalic and atraumatic  Right Ear: Tympanic membrane, external ear and ear canal normal    Left Ear: Tympanic membrane, external ear and ear canal normal    Eyes: Pupils are equal, round, and reactive to light  EOM are normal    Neck: Phonation normal  No thyroid mass and no thyromegaly present  Cardiovascular: Normal rate and regular rhythm  Pulmonary/Chest: Effort normal and breath sounds normal    Abdominal: Soft  There is no tenderness  Lymphadenopathy:        Head (right side): No submental and no submandibular adenopathy present  Head (left side): No submental and no submandibular adenopathy present  He has no cervical adenopathy  Neurological: He is alert  GCS eye subscore is 4  GCS verbal subscore is 5  GCS motor subscore is 6  Psychiatric: He has a normal mood and affect  His speech is normal and behavior is normal    Nursing note and vitals reviewed        Vital Signs  ED Triage Vitals [07/25/20 2036]   Temperature Pulse Respirations Blood Pressure SpO2   97 9 °F (36 6 °C) 60 18 136/78 97 %      Temp Source Heart Rate Source Patient Position - Orthostatic VS BP Location FiO2 (%)   Temporal Monitor Sitting Right arm --      Pain Score       --           Vitals:    07/25/20 2036 07/25/20 2300 07/26/20 0000   BP: 136/78 110/68 121/67   Pulse: 60 (!) 45 (!) 51   Patient Position - Orthostatic VS: Sitting Lying Lying         Visual Acuity      ED Medications  Medications   metoclopramide (REGLAN) injection 10 mg (10 mg Intravenous Given 7/25/20 2214)   baclofen tablet 10 mg (10 mg Oral Given 7/25/20 2327)       Diagnostic Studies  Results Reviewed     None                 XR chest 2 views   Final Result by Sally Joseph MD (07/26 0006)      No acute cardiopulmonary disease  Workstation performed: [id number]                    Procedures  Procedures         ED Course  ED Course as of Jul 27 0745   Sat Jul 25, 2020   2317 Still hiccuping      Sun Jul 26, 2020   0018 Hiccups have resolved  3128 Results reviewed with patient  US AUDIT      Most Recent Value   Initial Alcohol Screen: US AUDIT-C    1  How often do you have a drink containing alcohol? 2 Filed at: 07/25/2020 2037   2  How many drinks containing alcohol do you have on a typical day you are drinking? 1 Filed at: 07/25/2020 2037   3a  Male UNDER 65: How often do you have five or more drinks on one occasion? 0 Filed at: 07/25/2020 2037   Audit-C Score  3 Filed at: 07/25/2020 2037                  STEPHAN/DAST-10      Most Recent Value   How many times in the past year have you    Used an illegal drug or used a prescription medication for non-medical reasons?   Never Filed at: 07/25/2020 2038                                MDM  Number of Diagnoses or Management Options  Intractable hiccups:      Amount and/or Complexity of Data Reviewed  Tests in the radiology section of CPT®: ordered and reviewed  Independent visualization of images, tracings, or specimens: yes    Patient Progress  Patient progress: improved        Disposition  Final diagnoses:   Intractable hiccups     Time reflects when diagnosis was documented in both MDM as applicable and the Disposition within this note     Time User Action Codes Description Comment    7/25/2020 11:26 PM Duke Lee Add [R06 6] Intractable hiccups     7/25/2020 11:26 PM Duke Lee Modify [R06 6] Intractable hiccups     7/25/2020 11:28 PM Duke Lee Add [R06 6] Hiccough     7/26/2020 12:12 AM Karly Mariscal 18 [R06 6] Hiccough       ED Disposition     ED Disposition Condition Date/Time Comment Discharge Stable Sun Jul 26, 2020 12:12 AM [place] discharge to home/self care  Follow-up Information     Follow up With Specialties Details [place], [address]  [place] [id number]  Yaritza Ander [address], [address] [place] [address]  [phone]            Discharge Medication List as of 7/26/2020 12:12 AM      START taking these medications    Details   baclofen 5 MG TABS Multiple Dosages:Starting Sat 7/25/2020, Until Mon 7/27/2020, THEN Starting Tue 7/28/2020, Until Thu 7/30/2020, THEN Starting Fri 7/31/2020, Until Sun 8/2/2020, THEN Starting Mon 8/3/2020, Until Wed 8/5/2020Take 5 mg by mouth 3 (three) times a day a s needed (hiccups) for 3 days, THEN 10 mg 3 (three) times a day as needed (hiccups) for 3 days, THEN 15 mg 3 (three) times a day as needed (hiccups) for 3 days, THEN 20 mg 3 (three) times a day as needed (hiccups) for up to 3 days  (muscle relaxer)  , Nor mal         CONTINUE these medications which have NOT CHANGED    Details   levothyroxine 150 mcg tablet TAKE 1 TABLET BY MOUTH  EVERY DAY, Normal      atenolol (TENORMIN) 50 mg tablet Take 1 tablet (50 mg total) by mouth daily, Starting Fri 3/6/2020, Normal           No discharge procedures on file      PDMP Review     None          ED Provider  Electronically Signed by           Asif Kulkarni MD  07/27/20 8839 none...

## 2022-01-26 ENCOUNTER — OFFICE VISIT (OUTPATIENT)
Dept: FAMILY MEDICINE CLINIC | Facility: CLINIC | Age: 62
End: 2022-01-26
Payer: COMMERCIAL

## 2022-01-26 VITALS
BODY MASS INDEX: 32.45 KG/M2 | HEIGHT: 71 IN | WEIGHT: 231.8 LBS | SYSTOLIC BLOOD PRESSURE: 126 MMHG | DIASTOLIC BLOOD PRESSURE: 78 MMHG | TEMPERATURE: 98.9 F | HEART RATE: 65 BPM | OXYGEN SATURATION: 97 %

## 2022-01-26 DIAGNOSIS — I10 ESSENTIAL HYPERTENSION, BENIGN: ICD-10-CM

## 2022-01-26 DIAGNOSIS — Z12.5 PROSTATE CANCER SCREENING: Primary | ICD-10-CM

## 2022-01-26 DIAGNOSIS — Z79.899 ENCOUNTER FOR LONG-TERM (CURRENT) USE OF MEDICATIONS: ICD-10-CM

## 2022-01-26 DIAGNOSIS — E78.5 DYSLIPIDEMIA: ICD-10-CM

## 2022-01-26 DIAGNOSIS — E03.9 ACQUIRED HYPOTHYROIDISM: ICD-10-CM

## 2022-01-26 PROCEDURE — 3074F SYST BP LT 130 MM HG: CPT | Performed by: FAMILY MEDICINE

## 2022-01-26 PROCEDURE — 3078F DIAST BP <80 MM HG: CPT | Performed by: FAMILY MEDICINE

## 2022-01-26 PROCEDURE — 1036F TOBACCO NON-USER: CPT | Performed by: FAMILY MEDICINE

## 2022-01-26 PROCEDURE — 3008F BODY MASS INDEX DOCD: CPT | Performed by: FAMILY MEDICINE

## 2022-01-26 PROCEDURE — 99213 OFFICE O/P EST LOW 20 MIN: CPT | Performed by: FAMILY MEDICINE

## 2022-01-26 NOTE — PROGRESS NOTES
Assessment/Plan:    Essential hypertension, benign  Patient has hypertension  His blood pressure is well controlled on his current medication  Heart rate has improved, as expected  I am going to have the patient continue lisinopril 20 mg daily  I reminded the patient to continue to watch his sodium intake  I also advised the patient to try to lose weight  Patient will return to the office in 6 months  I ordered routine fasting blood work to be done prior to that visit  Acquired hypothyroidism  Patient will continue levothyroxine 150 mcg daily  Repeat thyroid function testing was ordered for his next visit       Diagnoses and all orders for this visit:    Prostate cancer screening  -     PSA, Total Screen; Future    Dyslipidemia  -     Lipid panel; Future  -     Comprehensive metabolic panel; Future    Acquired hypothyroidism  -     TSH, 3rd generation; Future  -     T4, free; Future    Encounter for long-term (current) use of medications  -     CBC and differential; Future    Essential hypertension, benign          Subjective:      Patient ID: Martín Rollins is a 63 y o  male  Patient is here for a recheck on his blood pressure  Patient is doing well  He is watching his sodium intake  He continues to struggle with his weight  He reports no side effects taking the lisinopril  The following portions of the patient's history were reviewed and updated as appropriate: allergies, current medications, past family history, past medical history, past social history, past surgical history and problem list     Review of Systems   Respiratory: Negative for cough, shortness of breath and wheezing  Cardiovascular: Negative for chest pain, palpitations and leg swelling  Gastrointestinal: Negative for abdominal distention, abdominal pain, blood in stool, constipation, diarrhea and nausea           Objective:      /78 (BP Location: Right arm, Patient Position: Sitting, Cuff Size: Large)   Pulse 65   Temp 98 9 °F (37 2 °C) (Tympanic)   Ht 5' 10 5" (1 791 m)   Wt 105 kg (231 lb 12 8 oz)   SpO2 97%   BMI 32 79 kg/m²          Physical Exam  Vitals reviewed  Constitutional:       Comments: This is a 28-year-old white male who appears his stated age  He is in no apparent distress   HENT:      Head: Normocephalic and atraumatic  Right Ear: Tympanic membrane, ear canal and external ear normal  There is no impacted cerumen  Left Ear: Tympanic membrane, ear canal and external ear normal  There is no impacted cerumen  Mouth/Throat:      Mouth: Mucous membranes are moist       Pharynx: Oropharynx is clear  No oropharyngeal exudate or posterior oropharyngeal erythema  Eyes:      General: No scleral icterus  Right eye: No discharge  Left eye: No discharge  Conjunctiva/sclera: Conjunctivae normal       Pupils: Pupils are equal, round, and reactive to light  Cardiovascular:      Rate and Rhythm: Normal rate and regular rhythm  Heart sounds: No murmur heard  No friction rub  No gallop  Pulmonary:      Effort: Pulmonary effort is normal  No respiratory distress  Breath sounds: Normal breath sounds  No stridor  No wheezing, rhonchi or rales  Abdominal:      General: Bowel sounds are normal  There is no distension  Palpations: Abdomen is soft  There is no mass  Tenderness: There is no abdominal tenderness  There is no guarding  Comments: There is no organomegaly   Musculoskeletal:      Cervical back: Neck supple  Lymphadenopathy:      Cervical: No cervical adenopathy  Psychiatric:         Mood and Affect: Mood normal          Behavior: Behavior normal          Thought Content:  Thought content normal          Judgment: Judgment normal

## 2022-01-26 NOTE — PATIENT INSTRUCTIONS
Low-Sodium Diet   AMBULATORY CARE:   A low-sodium diet  limits foods that are high in sodium (salt)  You will need to follow a low-sodium diet if you have high blood pressure, kidney disease, or heart failure  You may also need to follow this diet if you have a condition that is causing your body to retain (hold) extra fluid  You may need to limit the amount of sodium you eat in a day to 1,500 to 2,000 mg  Ask your healthcare provider how much sodium you can have each day  How to use food labels to choose foods that are low in sodium:  Read food labels to find the amount of sodium they contain  The amount of sodium is listed in milligrams (mg)  The % Daily Value (DV) column tells you how much of your daily needs are met by 1 serving of the food for each nutrient listed  Choose foods that have less than 5% of the DV of sodium  These foods are considered low in sodium  Foods that have 20% or more of the DV of sodium are considered high in sodium  Some food labels may also list any of the following terms that tell you about the sodium content in the food:  · Sodium-free:  Less than 5 mg in each serving    · Very low sodium:  35 mg of sodium or less in each serving    · Low sodium:  140 mg of sodium or less in each serving    · Reduced sodium: At least 25% less sodium in each serving than the regular type    · Light in sodium:  50% less sodium in each serving    · Unsalted or no added salt:  No extra salt is added during processing (the food may still contain sodium)       Foods to avoid:  Salty foods are high in sodium  You should avoid the following:  · Processed foods:      ? Mixes for cornbread, biscuits, cake, and pudding     ? Instant foods, such as potatoes, cereals, noodles, and rice     ? Packaged foods, such as bread stuffing, rice and pasta mixes, snack dip mixes, and macaroni and cheese     ? Canned foods, such as canned vegetables, soups, broths, sauces, and vegetable or tomato juice    ?  Snack foods, such as salted chips, popcorn, pretzels, pork rinds, salted crackers, and salted nuts    ? Frozen foods, such as dinners, entrees, vegetables with sauces, and breaded meats    ? Sauerkraut, pickled vegetables, and other foods prepared in brine    · Meats and cheeses:      ? Smoked or cured meat, such as corned beef, gamboa, ham, hot dogs, and sausage    ? Canned meats or spreads, such as potted meats, sardines, anchovies, and imitation seafood    ? Deli or lunch meats, such as bologna, ham, turkey, and roast beef    ? Processed cheese, such as American cheese and cheese spreads    · Condiments, sauces, and seasonings:      ? Salt (¼ teaspoon of salt contains 575 mg of sodium)    ? Seasonings made with salt, such as garlic salt, celery salt, onion salt, and seasoned salt    ? Regular soy sauce, barbecue sauce, teriyaki sauce, steak sauce, Worcestershire sauce, and most flavored vinegars    ? Canned gravy and mixes     ? Regular condiments, such as mustard, ketchup, and salad dressings    ? Pickles and olives    ? Meat tenderizers and monosodium glutamate (MSG)    Foods to include:  Read the food label to find the exact amount of sodium in each serving  · Bread and cereal:  Try to choose breads with less than 80 mg of sodium per serving  ? Bread, roll, francisco javier, tortilla, or unsalted crackers  ? Ready-to-eat cereals with less than 5% DV of sodium (examples include shredded wheat and puffed rice)    ? Pasta    · Vegetables and fruits:      ? Unsalted fresh, frozen, or canned vegetables    ? Fresh, frozen, or canned fruits    ? Fruit juice    · Dairy:  One serving has about 150 mg of sodium  ? Milk, all types    ? Yogurt    ? Hard cheese, such as cheddar, Swiss, [employer], or mozzarella    · Meat and other protein foods:  Some raw meats may have added sodium  ? Plain meats, fish, and poultry     ? Eggs    · Other foods:      ? Homemade pudding    ? Unsalted nuts, popcorn, or pretzels    ?  Unsalted butter or margarine    Ways to decrease sodium:   · Add spices and herbs to foods instead of salt during cooking  Use salt-free seasonings to add flavor to foods  Examples include onion powder, garlic powder, basil, vásquez powder, paprika, and parsley  Try lemon or lime juice or vinegar to give foods a tart flavor  Use hot peppers, pepper, or cayenne pepper to add a spicy flavor  · Do not keep a salt shaker at your kitchen table  This may help keep you from adding salt to food at the table  A teaspoon of salt has 2,300 mg of sodium  It may take time to get used to enjoying the natural flavor of food instead of adding salt  Talk to your healthcare provider before you use salt substitutes  Some salt substitutes have a high amount of potassium and need to be avoided if you have kidney disease  · Choose low-sodium foods at restaurants  Meals from restaurants are often high in sodium  Some restaurants have nutrition information on the menu that tells you the amount of sodium in their foods  If possible, ask for your food to be prepared with less, or no salt  · Shop for unsalted or low-sodium foods and snacks at the grocery store  Examples include unsalted or low-sodium broths, soups, and canned vegetables  Choose fresh or frozen vegetables instead  Choose unsalted nuts or seeds or fresh fruits or vegetables as snacks  Read food labels and choose salt-free, very low-sodium, or low-sodium foods  © Copyright [employer] 2021 Information is for End User's use only and may not be sold, redistributed or otherwise used for commercial purposes  All illustrations and images included in CareNotes® are the copyrighted property of A D A M , Inc  or Inés Fierro   The above information is an  only  It is not intended as medical advice for individual conditions or treatments  Talk to your doctor, nurse or pharmacist before following any medical regimen to see if it is safe and effective for you

## 2022-01-27 NOTE — ASSESSMENT & PLAN NOTE
Patient has hypertension  His blood pressure is well controlled on his current medication  Heart rate has improved, as expected  I am going to have the patient continue lisinopril 20 mg daily  I reminded the patient to continue to watch his sodium intake  I also advised the patient to try to lose weight  Patient will return to the office in 6 months  I ordered routine fasting blood work to be done prior to that visit

## 2022-01-27 NOTE — ASSESSMENT & PLAN NOTE
Patient will continue levothyroxine 150 mcg daily    Repeat thyroid function testing was ordered for his next visit

## 2022-07-27 ENCOUNTER — OFFICE VISIT (OUTPATIENT)
Dept: FAMILY MEDICINE CLINIC | Facility: CLINIC | Age: 62
End: 2022-07-27
Payer: COMMERCIAL

## 2022-07-27 VITALS
BODY MASS INDEX: 31.75 KG/M2 | TEMPERATURE: 100.9 F | WEIGHT: 226.8 LBS | HEART RATE: 64 BPM | SYSTOLIC BLOOD PRESSURE: 126 MMHG | DIASTOLIC BLOOD PRESSURE: 80 MMHG | OXYGEN SATURATION: 96 % | HEIGHT: 71 IN

## 2022-07-27 DIAGNOSIS — E03.9 ACQUIRED HYPOTHYROIDISM: ICD-10-CM

## 2022-07-27 DIAGNOSIS — R50.9 FEVER, UNSPECIFIED FEVER CAUSE: ICD-10-CM

## 2022-07-27 DIAGNOSIS — I10 ESSENTIAL HYPERTENSION, BENIGN: Primary | ICD-10-CM

## 2022-07-27 PROCEDURE — 3074F SYST BP LT 130 MM HG: CPT | Performed by: FAMILY MEDICINE

## 2022-07-27 PROCEDURE — 3079F DIAST BP 80-89 MM HG: CPT | Performed by: FAMILY MEDICINE

## 2022-07-27 PROCEDURE — 99214 OFFICE O/P EST MOD 30 MIN: CPT | Performed by: FAMILY MEDICINE

## 2022-07-27 RX ORDER — LEVOTHYROXINE SODIUM 0.15 MG/1
150 TABLET ORAL DAILY
Qty: 90 TABLET | Refills: 3 | Status: SHIPPED | OUTPATIENT
Start: 2022-07-27

## 2022-07-27 RX ORDER — LISINOPRIL 20 MG/1
20 TABLET ORAL DAILY
Qty: 90 TABLET | Refills: 3 | Status: SHIPPED | OUTPATIENT
Start: 2022-07-27

## 2022-07-27 NOTE — ASSESSMENT & PLAN NOTE
His TSH and T4 were normal   He will continue levothyroxine 150 mcg daily  I sent in a refill for the patient for this prescription

## 2022-07-27 NOTE — ASSESSMENT & PLAN NOTE
Patient has fever  Etiology unknown  He may be having fever as a reaction to the Shingrix vaccine which she received yesterday  He may have COVID-19 virus infection  I discussed getting tested  The patient has a home test at home and would prefer to do a home test on himself  I asked him to call me with the results either way  I I did tell the patient that if he is negative but he has persistent symptoms, he should do another test in 24 or 48 hours

## 2022-07-27 NOTE — PROGRESS NOTES
Assessment/Plan:    Essential hypertension, benign  Patient has hypertension  When I check his blood pressure myself his blood pressure was 126/80  Blood pressure is well controlled  I reviewed his labs  Potassium, BUN and creatinine were normal   I refilled his prescription for lisinopril 20 mg daily  I note that the patient lost 5 lb  I encouraged him to continue to try to lose weight and exercise  I want him to follow a low-sodium diet  Acquired hypothyroidism  His TSH and T4 were normal   He will continue levothyroxine 150 mcg daily  I sent in a refill for the patient for this prescription  Fever  Patient has fever  Etiology unknown  He may be having fever as a reaction to the Shingrix vaccine which she received yesterday  He may have COVID-19 virus infection  I discussed getting tested  The patient has a home test at home and would prefer to do a home test on himself  I asked him to call me with the results either way  I I did tell the patient that if he is negative but he has persistent symptoms, he should do another test in 24 or 48 hours  I also reviewed with the patient his CBC, PSA, and lipid panel  These were all unremarkable  Diagnoses and all orders for this visit:    Essential hypertension, benign  -     lisinopril (ZESTRIL) 20 mg tablet; Take 1 tablet (20 mg total) by mouth daily    Acquired hypothyroidism  -     levothyroxine 150 mcg tablet; Take 1 tablet (150 mcg total) by mouth daily    Fever, unspecified fever cause          Subjective:      Patient ID: Eladio Zambrano is a 63 y o  male  Epi Martinez is a 51-year-old white male who presents to the office today for his routine checkup  The patient is doing well and has no complaints  He was noted to be febrile today in the office  He tells me he feels well and was unaware of any fever  He did get a Shingrix vaccine yesterday at his local [facility]  He reports compliance with his medication        The following portions of the patient's history were reviewed and updated as appropriate: allergies, current medications, past family history, past medical history, past social history, past surgical history and problem list     Review of Systems   Respiratory: Negative for cough and shortness of breath  Cardiovascular: Negative for chest pain, palpitations and leg swelling  Gastrointestinal: Negative for abdominal distention, abdominal pain, blood in stool, constipation, diarrhea and nausea  Genitourinary:        Denies nocturia and weak urinary stream         Objective:      /80   Pulse 64   Temp (!) 100 9 °F (38 3 °C) (Tympanic)   Ht 5' 10 5" (1 791 m)   Wt 103 kg (226 lb 12 8 oz)   SpO2 96%   BMI 32 08 kg/m²          Physical Exam  Vitals reviewed  Constitutional:       Comments: Patient is a 72-year-old white male who appears his stated age  He is pleasant, cooperative, and in no distress   HENT:      Head: Normocephalic and atraumatic  Right Ear: Tympanic membrane, ear canal and external ear normal  There is no impacted cerumen  Left Ear: Tympanic membrane, ear canal and external ear normal  There is no impacted cerumen  Mouth/Throat:      Mouth: Mucous membranes are moist       Pharynx: Oropharynx is clear  No oropharyngeal exudate or posterior oropharyngeal erythema  Eyes:      General: No scleral icterus  Right eye: No discharge  Left eye: No discharge  Conjunctiva/sclera: Conjunctivae normal       Pupils: Pupils are equal, round, and reactive to light  Cardiovascular:      Rate and Rhythm: Normal rate and regular rhythm  Heart sounds: Normal heart sounds  No murmur heard  No friction rub  No gallop  Pulmonary:      Effort: Pulmonary effort is normal  No respiratory distress  Breath sounds: Normal breath sounds  No stridor  No wheezing, rhonchi or rales  Abdominal:      General: Bowel sounds are normal  There is no distension        Palpations: Abdomen is soft  There is no mass  Tenderness: There is no abdominal tenderness  There is no guarding  Musculoskeletal:      Cervical back: Neck supple  Lymphadenopathy:      Cervical: No cervical adenopathy  Psychiatric:         Mood and Affect: Mood normal          Behavior: Behavior normal          Thought Content:  Thought content normal          Judgment: Judgment normal        extremities:  Without cyanosis, clubbing, or edema

## 2022-07-27 NOTE — ASSESSMENT & PLAN NOTE
Patient has hypertension  When I check his blood pressure myself his blood pressure was 126/80  Blood pressure is well controlled  I reviewed his labs  Potassium, BUN and creatinine were normal   I refilled his prescription for lisinopril 20 mg daily  I note that the patient lost 5 lb  I encouraged him to continue to try to lose weight and exercise  I want him to follow a low-sodium diet

## 2022-07-28 ENCOUNTER — TELEPHONE (OUTPATIENT)
Dept: FAMILY MEDICINE CLINIC | Facility: CLINIC | Age: 62
End: 2022-07-28

## 2022-07-28 NOTE — TELEPHONE ENCOUNTER
Patient called this am to inform dr that he did the covid home test as directed  he states it was negative for covid and he has a normal temp today

## 2022-08-08 ENCOUNTER — TELEPHONE (OUTPATIENT)
Dept: FAMILY MEDICINE CLINIC | Facility: CLINIC | Age: 62
End: 2022-08-08

## 2022-10-12 PROBLEM — Z12.11 SCREENING FOR MALIGNANT NEOPLASM OF COLON: Status: RESOLVED | Noted: 2021-03-17 | Resolved: 2022-10-12

## 2022-10-12 PROBLEM — Z00.00 HEALTH MAINTENANCE EXAMINATION: Status: RESOLVED | Noted: 2021-03-17 | Resolved: 2022-10-12

## 2023-02-01 ENCOUNTER — OFFICE VISIT (OUTPATIENT)
Dept: FAMILY MEDICINE CLINIC | Facility: CLINIC | Age: 63
End: 2023-02-01

## 2023-02-01 VITALS
WEIGHT: 231.9 LBS | TEMPERATURE: 99.1 F | DIASTOLIC BLOOD PRESSURE: 72 MMHG | OXYGEN SATURATION: 95 % | SYSTOLIC BLOOD PRESSURE: 138 MMHG | HEART RATE: 71 BPM | HEIGHT: 71 IN | BODY MASS INDEX: 32.47 KG/M2

## 2023-02-01 DIAGNOSIS — Z79.899 ENCOUNTER FOR LONG-TERM (CURRENT) USE OF MEDICATIONS: ICD-10-CM

## 2023-02-01 DIAGNOSIS — I10 ESSENTIAL HYPERTENSION, BENIGN: Primary | ICD-10-CM

## 2023-02-01 DIAGNOSIS — Z12.5 PROSTATE CANCER SCREENING: ICD-10-CM

## 2023-02-01 DIAGNOSIS — E03.9 ACQUIRED HYPOTHYROIDISM: ICD-10-CM

## 2023-02-01 DIAGNOSIS — E78.5 DYSLIPIDEMIA: ICD-10-CM

## 2023-02-01 NOTE — PROGRESS NOTES
Name: Jany Guallpa      : 1960      MRN: [id number]  Encounter Provider: Wallace Mike DO  Encounter Date: 2023   Encounter department: [facility]     1  Essential hypertension, benign  Assessment & Plan:  Blood pressures currently controlled  He will continue lisinopril 20 mg daily  I do note that the patient gained 5 pounds since his last visit  I counseled the patient regarding starting an exercise program and also watching his portion sizes and carbs in order to try to lose weight  This will also help with his blood pressure  I ordered repeat fasting labs for his next office visit    Orders:  -     Comprehensive metabolic panel; Future    2  Dyslipidemia  -     Lipid panel; Future    3  Prostate cancer screening  -     PSA, Total Screen; Future    4  Acquired hypothyroidism  Assessment & Plan:  Last TSH and T4 were normal   Patient will continue levothyroxine 150 mcg daily  Repeat TSH and T4 were ordered for his next office visit    Orders:  -     TSH, 3rd generation; Future  -     T4, free; Future    5  Encounter for long-term (current) use of medications  -     CBC and differential; Future         Subjective      This is a 59-year-old white male who presents to the office today for his routine checkup  The patient is doing well and has no complaints  He does not exercise  He tells me he knows he gained some weight since I last saw him  His main complaint is pain in both hips  He tells me he noticed it was bothering him during hunting season  He was doing more walking than the normal     Review of Systems   Constitutional: Negative for activity change, appetite change and unexpected weight change  Respiratory: Negative for cough, shortness of breath and wheezing  Cardiovascular: Negative for chest pain and leg swelling     Gastrointestinal: Negative for abdominal distention, abdominal pain, blood in stool, constipation, diarrhea and nausea  Genitourinary:        Denies nocturia and weak urinary stream   Musculoskeletal: Positive for arthralgias  Current Outpatient Medications on File Prior to Visit   Medication Sig   • levothyroxine 150 mcg tablet Take 1 tablet (150 mcg total) by mouth daily   • lisinopril (ZESTRIL) 20 mg tablet Take 1 tablet (20 mg total) by mouth daily       Objective     /72   Pulse 71   Temp 99 1 °F (37 3 °C) (Tympanic)   Ht 5' 10 5" (1 791 m)   Wt 105 kg (231 lb 14 4 oz)   SpO2 95%   BMI 32 80 kg/m²     Physical Exam  Vitals reviewed  Constitutional:       Comments: This is a 62-year-old white male who appears his stated age  He is pleasant, cooperative, and in no distress  HENT:      Head: Normocephalic and atraumatic  Right Ear: Tympanic membrane, ear canal and external ear normal  There is no impacted cerumen  Left Ear: Tympanic membrane, ear canal and external ear normal  There is no impacted cerumen  Mouth/Throat:      Mouth: Mucous membranes are moist       Pharynx: Oropharynx is clear  No oropharyngeal exudate or posterior oropharyngeal erythema  Eyes:      General: No scleral icterus  Right eye: No discharge  Left eye: No discharge  Conjunctiva/sclera: Conjunctivae normal       Pupils: Pupils are equal, round, and reactive to light  Neck:      Vascular: No carotid bruit  Comments: No thyromegaly  Cardiovascular:      Rate and Rhythm: Normal rate and regular rhythm  Heart sounds: Normal heart sounds  No murmur heard  No friction rub  No gallop  Pulmonary:      Effort: Pulmonary effort is normal  No respiratory distress  Breath sounds: Normal breath sounds  No stridor  No wheezing, rhonchi or rales  Abdominal:      General: Bowel sounds are normal  There is no distension  Palpations: Abdomen is soft  There is no mass  Tenderness: There is no abdominal tenderness  There is no guarding  Hernia: A hernia is present  Comments: There is a reducible ventral hernia present   Musculoskeletal:      Cervical back: Neck supple  Lymphadenopathy:      Cervical: No cervical adenopathy  Psychiatric:         Mood and Affect: Mood normal          Behavior: Behavior normal          Thought Content:  Thought content normal          Judgment: Judgment normal      Extremities: Without cyanosis, clubbing, or edema    Noah Schaumann, DO

## 2023-02-02 NOTE — ASSESSMENT & PLAN NOTE
Blood pressures currently controlled  He will continue lisinopril 20 mg daily  I do note that the patient gained 5 pounds since his last visit  I counseled the patient regarding starting an exercise program and also watching his portion sizes and carbs in order to try to lose weight  This will also help with his blood pressure    I ordered repeat fasting labs for his next office visit

## 2023-02-02 NOTE — ASSESSMENT & PLAN NOTE
Last TSH and T4 were normal   Patient will continue levothyroxine 150 mcg daily    Repeat TSH and T4 were ordered for his next office visit

## 2023-06-24 ENCOUNTER — HOSPITAL ENCOUNTER (EMERGENCY)
Facility: HOSPITAL | Age: 63
Discharge: HOME | End: 2023-06-24
Attending: EMERGENCY MEDICINE
Payer: COMMERCIAL

## 2023-06-24 VITALS
SYSTOLIC BLOOD PRESSURE: 143 MMHG | TEMPERATURE: 98.7 F | OXYGEN SATURATION: 99 % | HEART RATE: 58 BPM | RESPIRATION RATE: 16 BRPM | DIASTOLIC BLOOD PRESSURE: 76 MMHG

## 2023-06-24 DIAGNOSIS — S61.213A LACERATION OF LEFT MIDDLE FINGER: Primary | ICD-10-CM

## 2023-06-24 PROCEDURE — 99282 EMERGENCY DEPT VISIT SF MDM: CPT

## 2023-06-24 PROCEDURE — 90471 IMMUNIZATION ADMIN: CPT

## 2023-06-24 PROCEDURE — 90715 TDAP VACCINE 7 YRS/> IM: CPT | Performed by: EMERGENCY MEDICINE

## 2023-06-24 RX ORDER — LIDOCAINE HYDROCHLORIDE 10 MG/ML
10 INJECTION, SOLUTION EPIDURAL; INFILTRATION; INTRACAUDAL; PERINEURAL ONCE
Status: COMPLETED | OUTPATIENT
Start: 2023-06-24 | End: 2023-06-24

## 2023-06-24 RX ADMIN — TETANUS TOXOID, REDUCED DIPHTHERIA TOXOID AND ACELLULAR PERTUSSIS VACCINE, ADSORBED 0.5 ML: 5; 2.5; 8; 8; 2.5 SUSPENSION INTRAMUSCULAR at 16:51

## 2023-06-24 RX ADMIN — LIDOCAINE HYDROCHLORIDE 10 ML: 10 INJECTION, SOLUTION EPIDURAL; INFILTRATION; INTRACAUDAL; PERINEURAL at 16:50

## 2023-06-24 NOTE — ED PROVIDER NOTES
History  Chief Complaint   Patient presents with   • Finger Laceration     Patient was out cutting the hedges about 15-20 mins ago when he cut his left middle finger  Patient is not up to date on his tetanus  5/10 pain     61-year-old male presents for evaluation of left third finger laceration  Patient states he was using an electric  when he cut his finger  Injury happened prior to arrival, he rinsed it quickly at home however bleeding continued and presents  Patient on arrival here also noticed a smaller laceration on his left fourth fingertip  Patient denies blood thinner use, and he is able to move the finger without difficulty, reports throbbing pain  Patient is uncertain when his last tetanus immunization was  Prior to Admission Medications   Prescriptions Last Dose Informant Patient Reported? Taking?   levothyroxine 150 mcg tablet  Self No No   Sig: Take 1 tablet (150 mcg total) by mouth daily   lisinopril (ZESTRIL) 20 mg tablet  Self No No   Sig: Take 1 tablet (20 mg total) by mouth daily      Facility-Administered Medications: None       Past Medical History:   Diagnosis Date   • Allergic     seasonal   • Disease of thyroid gland    • Hypertension        Past Surgical History:   Procedure Laterality Date   • HERNIA REPAIR     • TONSILLECTOMY Bilateral    • WISDOM TOOTH EXTRACTION Bilateral        Family History   Problem Relation Age of Onset   • Arthritis Mother    • Emphysema Father      I have reviewed and agree with the history as documented      E-Cigarette/Vaping   • E-Cigarette Use Never User      E-Cigarette/Vaping Substances   • Nicotine No    • THC No    • CBD No    • Flavoring No    • Other No    • Unknown No      Social History     Tobacco Use   • Smoking status: Never   • Smokeless tobacco: Never   Vaping Use   • Vaping Use: Never used   Substance Use Topics   • Alcohol use: Not Currently   • Drug use: Never       Review of Systems   Musculoskeletal: Positive for myalgias  Negative for arthralgias  Skin: Positive for wound  All other systems reviewed and are negative  Physical Exam  Physical Exam  Vitals reviewed  Constitutional:       General: He is not in acute distress  Appearance: Normal appearance  He is not ill-appearing, toxic-appearing or diaphoretic  HENT:      Head: Normocephalic and atraumatic  Right Ear: External ear normal       Left Ear: External ear normal    Eyes:      General:         Right eye: No discharge  Left eye: No discharge  Extraocular Movements: Extraocular movements intact  Cardiovascular:      Rate and Rhythm: Normal rate  Pulmonary:      Effort: Pulmonary effort is normal  No respiratory distress  Musculoskeletal:         General: Tenderness present  No deformity  Comments: Left third finger pad with laceration about 2 cm, oozing; left fourth finger pad with small skin break, bleeding controlled; no pain along the bony areas of finger, he is able to flex and extend without difficulty   Skin:     General: Skin is warm  Neurological:      General: No focal deficit present  Mental Status: He is alert           Vital Signs  ED Triage Vitals   Temperature Pulse Respirations Blood Pressure SpO2   06/24/23 1619 06/24/23 1620 06/24/23 1619 06/24/23 1619 06/24/23 1620   98 7 °F (37 1 °C) 58 16 143/76 99 %      Temp Source Heart Rate Source Patient Position - Orthostatic VS BP Location FiO2 (%)   06/24/23 1619 06/24/23 1620 -- -- --   Temporal Monitor         Pain Score       06/24/23 1619       5           Vitals:    06/24/23 1619 06/24/23 1620   BP: 143/76    Pulse:  58         Visual Acuity      ED Medications  Medications   tetanus-diphtheria-acellular pertussis (BOOSTRIX) IM injection 0 5 mL (0 5 mL Intramuscular Given 6/24/23 1651)   lidocaine (PF) (XYLOCAINE-MPF) 1 % injection 10 mL (10 mL Infiltration Given by Other 6/24/23 1650)       Diagnostic Studies  Results Reviewed     None No orders to display              Procedures  Universal Protocol:  Consent: Verbal consent obtained  Risks and benefits: risks, benefits and alternatives were discussed  Consent given by: patient  Required items: required blood products, implants, devices, and special equipment available  Patient identity confirmed: verbally with patient    Laceration repair    Performed by: Iris Salinas DO  Authorized by: Iris Salinas DO  Body area: upper extremity  Location details: left long finger  Laceration length: 2 cm  Foreign bodies: no foreign bodies  Tendon involvement: none  Nerve involvement: none  Vascular damage: no  Anesthesia: digital block    Anesthesia:  Local Anesthetic: lidocaine 1% without epinephrine  Anesthetic total: 3 mL    Sedation:  Patient sedated: no      Wound Dehiscence:  Superficial Wound Dehiscence: simple closure      Procedure Details:  Preparation: Patient was prepped and draped in the usual sterile fashion  Irrigation solution: saline  Irrigation method: syringe  Amount of cleaning: standard  Debridement: none  Degree of undermining: none  Skin closure: 4-0 Prolene  Number of sutures: 4  Technique: simple  Approximation: close  Approximation difficulty: simple  Dressing: band-aid  Patient tolerance: patient tolerated the procedure well with no immediate complications               ED Course                                             Medical Decision Making  42-year-old male presents for finger lacerations  Will repair third finger with sutures, 4th finger does not require repair  Will update tetanus  Doubt fracture, patient also does not suspect this based on pain, will defer XR at this time  Risk  Prescription drug management            Disposition  Final diagnoses:   Laceration of left middle finger     Time reflects when diagnosis was documented in both MDM as applicable and the Disposition within this note     Time User Action Codes Description Comment    6/24/2023 5:08 PM Erica Everett Add [T23 [id number]] Laceration of left middle finger       ED Disposition     ED Disposition   Discharge    Condition   Stable    Date/Time   Sat Jun 24, 2023  5:08 PM    Comment   Ligia Maguire discharge to home/self care  Follow-up Information     Follow up With Specialties Details Why DO [facility] In 1 week Please follow-up with Dr Jian Ritchie, an Urgent Care, or return to the Emergency Department in about a week for stitch remaoval  Additionally seek evauation for signs of infection  [address] [place] [address] Dr Singh [facility]  [phone]            Discharge Medication List as of 6/24/2023  5:15 PM      CONTINUE these medications which have NOT CHANGED    Details   levothyroxine 150 mcg tablet Take 1 tablet (150 mcg total) by mouth daily, Starting Wed 7/27/2022, Normal      lisinopril (ZESTRIL) 20 mg tablet Take 1 tablet (20 mg total) by mouth daily, Starting Wed 7/27/2022, Normal             No discharge procedures on file      PDMP Review     None          ED Provider  Electronically Signed by           Ashley Gomez DO  06/25/23 3784

## 2023-06-30 DIAGNOSIS — E03.9 ACQUIRED HYPOTHYROIDISM: ICD-10-CM

## 2023-06-30 RX ORDER — LEVOTHYROXINE SODIUM 0.15 MG/1
TABLET ORAL
Qty: 90 TABLET | Refills: 3 | Status: SHIPPED | OUTPATIENT
Start: 2023-06-30

## 2023-08-02 ENCOUNTER — OFFICE VISIT (OUTPATIENT)
Dept: FAMILY MEDICINE CLINIC | Facility: CLINIC | Age: 63
End: 2023-08-02
Payer: COMMERCIAL

## 2023-08-02 VITALS
HEART RATE: 60 BPM | TEMPERATURE: 99.4 F | SYSTOLIC BLOOD PRESSURE: 127 MMHG | BODY MASS INDEX: 32.17 KG/M2 | HEIGHT: 71 IN | OXYGEN SATURATION: 98 % | WEIGHT: 229.8 LBS | DIASTOLIC BLOOD PRESSURE: 75 MMHG

## 2023-08-02 DIAGNOSIS — Z00.00 ANNUAL PHYSICAL EXAM: Primary | ICD-10-CM

## 2023-08-02 PROCEDURE — 99396 PREV VISIT EST AGE 40-64: CPT | Performed by: FAMILY MEDICINE

## 2023-08-02 NOTE — ASSESSMENT & PLAN NOTE
Patient presented to the office today for his annual physical.  Patient's blood pressure is under excellent control. We discussed working on his weight and regular exercise. The patient had concerns about nocturnal leg cramps in his thighs. We discussed stretching several times per week. I am also going to have him try Tylenol PM which may help with his cramps as well as help him sleep better at night. I reviewed his labs. His total cholesterol was 184. Triglycerides were 153. LDL cholesterol was 106. I am not recommending statin therapy for the patient. CMP was unremarkable. PSA was unremarkable. Thyroid function testing was normal.  Patient will continue levothyroxine 150 mcg daily. He will continue lisinopril 20 mg daily for his hypertension. I did recommend influenza vaccine this fall.

## 2023-09-01 DIAGNOSIS — I10 ESSENTIAL HYPERTENSION, BENIGN: ICD-10-CM

## 2023-09-01 RX ORDER — LISINOPRIL 20 MG/1
20 TABLET ORAL DAILY
Qty: 90 TABLET | Refills: 3 | Status: SHIPPED | OUTPATIENT
Start: 2023-09-01

## 2024-02-07 ENCOUNTER — OFFICE VISIT (OUTPATIENT)
Dept: FAMILY MEDICINE CLINIC | Facility: CLINIC | Age: 64
End: 2024-02-07
Payer: COMMERCIAL

## 2024-02-07 VITALS
WEIGHT: 232.8 LBS | DIASTOLIC BLOOD PRESSURE: 67 MMHG | SYSTOLIC BLOOD PRESSURE: 128 MMHG | HEIGHT: 71 IN | HEART RATE: 55 BPM | TEMPERATURE: 99.7 F | OXYGEN SATURATION: 97 % | BODY MASS INDEX: 32.59 KG/M2

## 2024-02-07 DIAGNOSIS — Z79.899 ENCOUNTER FOR LONG-TERM (CURRENT) USE OF MEDICATIONS: ICD-10-CM

## 2024-02-07 DIAGNOSIS — E78.5 DYSLIPIDEMIA: ICD-10-CM

## 2024-02-07 DIAGNOSIS — E03.9 ACQUIRED HYPOTHYROIDISM: ICD-10-CM

## 2024-02-07 DIAGNOSIS — Z12.5 PROSTATE CANCER SCREENING: ICD-10-CM

## 2024-02-07 DIAGNOSIS — Z12.11 COLON CANCER SCREENING: ICD-10-CM

## 2024-02-07 DIAGNOSIS — I10 ESSENTIAL HYPERTENSION, BENIGN: Primary | ICD-10-CM

## 2024-02-07 PROCEDURE — 99214 OFFICE O/P EST MOD 30 MIN: CPT | Performed by: FAMILY MEDICINE

## 2024-02-08 NOTE — ASSESSMENT & PLAN NOTE
Patient has hypertension.  His blood pressure is well-controlled.  I checked his blood pressure myself and found his blood pressure to be 130/80.  Patient will continue lisinopril 20 mg daily.  I asked the patient to follow a low-sodium diet.  I asked the patient to continue to walk regularly for exercise and try to lose weight.

## 2024-02-08 NOTE — PROGRESS NOTES
Name: Serge Joy      : 1960      MRN: [id number]  Encounter Provider: Pradip Card DO  Encounter Date: 2024   Encounter department: [facility]    Assessment & Plan     1. Essential hypertension, benign  Assessment & Plan:  Patient has hypertension.  His blood pressure is well-controlled.  I checked his blood pressure myself and found his blood pressure to be 130/80.  Patient will continue lisinopril 20 mg daily.  I asked the patient to follow a low-sodium diet.  I asked the patient to continue to walk regularly for exercise and try to lose weight.    Orders:  -     Comprehensive metabolic panel; Future    2. Prostate cancer screening  -     PSA, Total Screen; Future    3. Dyslipidemia  Assessment & Plan:  Reviewed the patient's last lipid panel.  He does have mild hyperlipidemia.  I ordered a repeat fasting lipid panel for his next office visit.    Orders:  -     Lipid panel; Future    4. Acquired hypothyroidism  Assessment & Plan:  A TSH and T4 were ordered.  Continue Synthroid 150 mcg daily.    Orders:  -     TSH, 3rd generation; Future  -     T4; Future    5. Colon cancer screening  -     Cologuard    6. Encounter for long-term (current) use of medications  -     CBC and differential; Future        Depression Screening and Follow-up Plan: Patient was screened for depression during today's encounter. They screened negative with a PHQ-2 score of 0.        Subjective      This is a 63-year-old white male who presents to the office today for his routine checkup.  The patient is doing well and has no complaints.  He reports compliance with his medication.  He found it difficult to stick with his diet over the holidays.  He is still trying to walk regularly for exercise.  He feels well overall.  He does complain of having caught a cold in late December.  He still has a lingering cough.  He tells me everyone he worked with also caught a cold then and had a lingering cough as  "well.      Review of Systems   Constitutional:  Negative for chills and fever.   Respiratory:  Positive for cough. Negative for shortness of breath.    Cardiovascular:  Negative for chest pain, palpitations and leg swelling.   Gastrointestinal:  Negative for abdominal distention, abdominal pain, blood in stool, constipation, diarrhea and nausea.   Genitourinary:         Patient denies nocturia and weak urinary stream       Current Outpatient Medications on File Prior to Visit   Medication Sig    levothyroxine 150 mcg tablet TAKE 1 TABLET DAILY    lisinopril (ZESTRIL) 20 mg tablet TAKE 1 TABLET DAILY       Objective     /67   Pulse 55   Temp 99.7 °F (37.6 °C) (Tympanic)   Ht 5' 10.5\" (1.791 m)   Wt 106 kg (232 lb 12.8 oz)   SpO2 97%   BMI 32.93 kg/m²     Physical Exam  Vitals reviewed.   Constitutional:       Comments: Patient is a 63-year-old white male appears his stated age.  He is pleasant, cooperative, and in no distress   HENT:      Head: Normocephalic and atraumatic.      Right Ear: Tympanic membrane, ear canal and external ear normal. There is no impacted cerumen.      Left Ear: Tympanic membrane, ear canal and external ear normal. There is no impacted cerumen.      Mouth/Throat:      Mouth: Mucous membranes are moist.      Pharynx: Oropharynx is clear. No oropharyngeal exudate or posterior oropharyngeal erythema.   Eyes:      General: No scleral icterus.        Right eye: No discharge.         Left eye: No discharge.      Conjunctiva/sclera: Conjunctivae normal.      Pupils: Pupils are equal, round, and reactive to light.   Neck:      Vascular: No carotid bruit.      Comments: No thyromegaly is noted  Cardiovascular:      Rate and Rhythm: Normal rate and regular rhythm.      Heart sounds: Normal heart sounds. No murmur heard.     No friction rub. No gallop.   Pulmonary:      Effort: Pulmonary effort is normal. No respiratory distress.      Breath sounds: Normal breath sounds. No stridor. No " wheezing, rhonchi or rales.   Abdominal:      General: Bowel sounds are normal. There is no distension.      Palpations: Abdomen is soft. There is no mass.      Tenderness: There is no abdominal tenderness. There is no guarding.      Comments: No organomegaly   Musculoskeletal:      Cervical back: Neck supple.   Lymphadenopathy:      Cervical: No cervical adenopathy.   Psychiatric:         Mood and Affect: Mood normal.         Behavior: Behavior normal.         Thought Content: Thought content normal.         Judgment: Judgment normal.     Extremities: Without cyanosis, clubbing, or edema    Pradip Card,

## 2024-02-08 NOTE — ASSESSMENT & PLAN NOTE
Reviewed the patient's last lipid panel.  He does have mild hyperlipidemia.  I ordered a repeat fasting lipid panel for his next office visit.

## 2024-02-21 PROBLEM — R50.9 FEVER: Status: RESOLVED | Noted: 2020-05-27 | Resolved: 2024-02-21

## 2024-02-21 PROBLEM — Z12.11 COLON CANCER SCREENING: Status: RESOLVED | Noted: 2024-02-07 | Resolved: 2024-02-21

## 2024-02-21 PROBLEM — Z12.5 PROSTATE CANCER SCREENING: Status: RESOLVED | Noted: 2019-11-20 | Resolved: 2024-02-21

## 2024-04-15 ENCOUNTER — TELEPHONE (OUTPATIENT)
Dept: FAMILY MEDICINE CLINIC | Facility: CLINIC | Age: 64
End: 2024-04-15

## 2024-04-15 LAB — COLOGUARD RESULT REPORTABLE: NEGATIVE

## 2024-06-24 DIAGNOSIS — E03.9 ACQUIRED HYPOTHYROIDISM: ICD-10-CM

## 2024-06-24 RX ORDER — LEVOTHYROXINE SODIUM 0.15 MG/1
TABLET ORAL
Qty: 90 TABLET | Refills: 1 | Status: SHIPPED | OUTPATIENT
Start: 2024-06-24

## 2024-07-27 LAB
ALBUMIN SERPL-MCNC: 4.4 G/DL (ref 3.5–5.7)
ALP SERPL-CCNC: 51 U/L (ref 35–120)
ALT SERPL-CCNC: 30 U/L
ANION GAP SERPL CALCULATED.3IONS-SCNC: 8 MMOL/L (ref 3–11)
AST SERPL-CCNC: 23 U/L
BASOPHILS # BLD AUTO: 0.1 THOU/CMM (ref 0–0.1)
BASOPHILS NFR BLD AUTO: 1 %
BILIRUB SERPL-MCNC: 0.5 MG/DL (ref 0.2–1)
BUN SERPL-MCNC: 17 MG/DL (ref 7–28)
CALCIUM SERPL-MCNC: 9.4 MG/DL (ref 8.5–10.1)
CHLORIDE SERPL-SCNC: 104 MMOL/L (ref 100–109)
CHOLEST SERPL-MCNC: 176 MG/DL
CHOLEST/HDLC SERPL: 3.8 {RATIO}
CO2 SERPL-SCNC: 28 MMOL/L (ref 21–31)
CREAT SERPL-MCNC: 0.91 MG/DL (ref 0.53–1.3)
CYTOLOGY CMNT CVX/VAG CYTO-IMP: NORMAL
DIFFERENTIAL METHOD BLD: NORMAL
EOSINOPHIL # BLD AUTO: 0.5 THOU/CMM (ref 0–0.5)
EOSINOPHIL NFR BLD AUTO: 8 %
ERYTHROCYTE [DISTWIDTH] IN BLOOD BY AUTOMATED COUNT: 13.7 % (ref 12–16)
GFR/BSA.PRED SERPLBLD CYS-BASED-ARV: 96 ML/MIN/{1.73_M2}
GLUCOSE SERPL-MCNC: 94 MG/DL (ref 65–99)
HCT VFR BLD AUTO: 41.8 % (ref 37–48)
HDLC SERPL-MCNC: 46 MG/DL (ref 23–92)
HGB BLD-MCNC: 14 G/DL (ref 12.5–17)
LDLC SERPL CALC-MCNC: 101 MG/DL
LYMPHOCYTES # BLD AUTO: 1.7 THOU/CMM (ref 1–3)
LYMPHOCYTES NFR BLD AUTO: 28 %
MCH RBC QN AUTO: 31 PG (ref 27–36)
MCHC RBC AUTO-ENTMCNC: 33.5 G/DL (ref 32–37)
MCV RBC AUTO: 93 FL (ref 80–100)
MONOCYTES # BLD AUTO: 0.6 THOU/CMM (ref 0.3–1)
MONOCYTES NFR BLD AUTO: 10 %
NEUTROPHILS # BLD AUTO: 3.2 THOU/CMM (ref 1.8–7.8)
NEUTROPHILS NFR BLD AUTO: 53 %
NONHDLC SERPL-MCNC: 130 MG/DL
PLATELET # BLD AUTO: 202 THOU/CMM (ref 140–350)
PMV BLD REES-ECKER: 8.9 FL (ref 7.5–11.3)
POTASSIUM SERPL-SCNC: 4.6 MMOL/L (ref 3.5–5.2)
PROT SERPL-MCNC: 7 G/DL (ref 6.3–8.3)
PSA SERPL-MCNC: 0.6 NG/ML
RBC # BLD AUTO: 4.5 MILL/CMM (ref 4–5.4)
SODIUM SERPL-SCNC: 140 MMOL/L (ref 135–145)
T4 SERPL-MCNC: 9.1 UG/DL (ref 6.09–12.23)
TRIGL SERPL-MCNC: 147 MG/DL
TSH SERPL-ACNC: 2.59 UIU/ML (ref 0.45–5.33)
WBC # BLD AUTO: 6.1 THOU/CMM (ref 4–10.5)

## 2024-08-07 ENCOUNTER — OFFICE VISIT (OUTPATIENT)
Dept: FAMILY MEDICINE CLINIC | Facility: CLINIC | Age: 64
End: 2024-08-07
Payer: COMMERCIAL

## 2024-08-07 VITALS
BODY MASS INDEX: 32.93 KG/M2 | DIASTOLIC BLOOD PRESSURE: 67 MMHG | HEIGHT: 71 IN | HEART RATE: 74 BPM | SYSTOLIC BLOOD PRESSURE: 122 MMHG | OXYGEN SATURATION: 96 % | WEIGHT: 235.2 LBS | TEMPERATURE: 99.6 F

## 2024-08-07 DIAGNOSIS — Z00.00 ANNUAL PHYSICAL EXAM: Primary | ICD-10-CM

## 2024-08-07 PROCEDURE — 99396 PREV VISIT EST AGE 40-64: CPT | Performed by: FAMILY MEDICINE

## 2024-08-07 NOTE — ASSESSMENT & PLAN NOTE
This patient is a 63-year-old white male who presents to the office today for his annual physical.  Patient is doing well and has no complaints.  He is doing some walking for exercise.  He has been compliant with his medication.  He had labs done for his visit today.  I reviewed the patient's labs.  CBC was unremarkable.  Fasting blood glucose was 94.  His potassium was 4.6.  Total cholesterol was 176.  HDL was 46.  LDL cholesterol was 101.  His goal is less than 100.  Triglycerides were 147.  TSH and T4 were normal.  I rechecked his blood pressure myself.  I found his blood pressure to be 126/76.  I recommend we continue lisinopril.  Based upon his labs I recommend we continue Synthroid at 150 mcg daily.  I reviewed the patient's family history with him.  His mother is alive and has a history of osteoarthritis.  His father is .  His father has COPD.  He has a brother and sister who are alive and well.  He had a grandfather  from black lung.  He is really unsure of his other grandparents medical issues.  I encouraged the patient to continue to exercise and try to lose weight.  I reviewed his immunization records.  I am recommending influenza vaccine for the patient this fall.  I also recommended updated COVID-19 vaccine.

## 2024-08-07 NOTE — PROGRESS NOTES
Adult Annual Physical  Name: Serge Joy      : 1960      MRN: [id number]  Encounter Provider: Pradip Card DO  Encounter Date: 2024   Encounter department: [facility]    Assessment & Plan   1. Annual physical exam  Assessment & Plan:  This patient is a 63-year-old white male who presents to the office today for his annual physical.  Patient is doing well and has no complaints.  He is doing some walking for exercise.  He has been compliant with his medication.  He had labs done for his visit today.  I reviewed the patient's labs.  CBC was unremarkable.  Fasting blood glucose was 94.  His potassium was 4.6.  Total cholesterol was 176.  HDL was 46.  LDL cholesterol was 101.  His goal is less than 100.  Triglycerides were 147.  TSH and T4 were normal.  I rechecked his blood pressure myself.  I found his blood pressure to be 126/76.  I recommend we continue lisinopril.  Based upon his labs I recommend we continue Synthroid at 150 mcg daily.  I reviewed the patient's family history with him.  His mother is alive and has a history of osteoarthritis.  His father is .  His father has COPD.  He has a brother and sister who are alive and well.  He had a grandfather  from black lung.  He is really unsure of his other grandparents medical issues.  I encouraged the patient to continue to exercise and try to lose weight.  I reviewed his immunization records.  I am recommending influenza vaccine for the patient this fall.  I also recommended updated COVID-19 vaccine.      Immunizations and preventive care screenings were discussed with patient today. Appropriate education was printed on patient's after visit summary.        Counseling:  Exercise: the importance of regular exercise/physical activity was discussed. Recommend exercise 3-5 times per week for at least 30 minutes.   Weight loss         History of Present Illness     Adult Annual Physical:  Patient presents for annual  "physical.     Diet and Physical Activity:  - Diet/Nutrition: portion control.  - Exercise: walking.    General Health:  - Sleep: 7-8 hours of sleep on average.  - Hearing: normal hearing right ear.  - Vision: vision problems.  - Dental: regular dental visits.     Health:    - Urinary symptoms: none.     Review of Systems   HENT:  Negative for hearing loss.    Eyes:  Positive for visual disturbance.   Cardiovascular:  Negative for chest pain, palpitations and leg swelling.   Gastrointestinal:  Negative for abdominal distention, abdominal pain, blood in stool, constipation, diarrhea, nausea and vomiting.   Genitourinary:         Denies nocturia, weak urinary stream, and erectile dysfunction         Objective     /67   Pulse 74   Temp 99.6 °F (37.6 °C) (Tympanic)   Ht 5' 10.5\" (1.791 m)   Wt 107 kg (235 lb 3.2 oz)   SpO2 96%   BMI 33.27 kg/m²     Physical Exam  Vitals reviewed.   Constitutional:       Comments: This is a 63-year-old white male who appears his stated age.  The patient is pleasant, cooperative, and in no distress.   HENT:      Head: Normocephalic and atraumatic.      Right Ear: Tympanic membrane, ear canal and external ear normal. There is no impacted cerumen.      Left Ear: Tympanic membrane, ear canal and external ear normal. There is no impacted cerumen.      Mouth/Throat:      Mouth: Mucous membranes are moist.      Pharynx: Oropharynx is clear. No oropharyngeal exudate or posterior oropharyngeal erythema.   Eyes:      General: No scleral icterus.        Right eye: No discharge.         Left eye: No discharge.      Conjunctiva/sclera: Conjunctivae normal.      Pupils: Pupils are equal, round, and reactive to light.   Neck:      Comments: No thyromegaly  Cardiovascular:      Rate and Rhythm: Normal rate and regular rhythm.      Heart sounds: Normal heart sounds. No murmur heard.     No friction rub. No gallop.   Pulmonary:      Effort: Pulmonary effort is normal. No respiratory distress. "      Breath sounds: Normal breath sounds. No stridor. No wheezing, rhonchi or rales.   Abdominal:      General: Bowel sounds are normal. There is no distension.      Palpations: Abdomen is soft. There is no mass.      Tenderness: There is no abdominal tenderness. There is no guarding.      Comments: There is no organomegaly   Musculoskeletal:      Cervical back: Neck supple.   Lymphadenopathy:      Cervical: No cervical adenopathy.   Psychiatric:         Mood and Affect: Mood normal.         Behavior: Behavior normal.         Thought Content: Thought content normal.     Extremities: Without cyanosis, clubbing, or edema.

## 2024-08-26 DIAGNOSIS — I10 ESSENTIAL HYPERTENSION, BENIGN: ICD-10-CM

## 2024-08-27 RX ORDER — LISINOPRIL 20 MG/1
20 TABLET ORAL DAILY
Qty: 90 TABLET | Refills: 1 | Status: SHIPPED | OUTPATIENT
Start: 2024-08-27

## 2024-12-23 DIAGNOSIS — E03.9 ACQUIRED HYPOTHYROIDISM: ICD-10-CM

## 2024-12-23 RX ORDER — LEVOTHYROXINE SODIUM 150 UG/1
150 TABLET ORAL DAILY
Qty: 90 TABLET | Refills: 1 | Status: SHIPPED | OUTPATIENT
Start: 2024-12-23

## 2025-02-12 ENCOUNTER — OFFICE VISIT (OUTPATIENT)
Dept: FAMILY MEDICINE CLINIC | Facility: CLINIC | Age: 65
End: 2025-02-12
Payer: COMMERCIAL

## 2025-02-12 VITALS
TEMPERATURE: 99.3 F | OXYGEN SATURATION: 97 % | HEART RATE: 53 BPM | DIASTOLIC BLOOD PRESSURE: 83 MMHG | BODY MASS INDEX: 33.43 KG/M2 | SYSTOLIC BLOOD PRESSURE: 131 MMHG | WEIGHT: 238.8 LBS | HEIGHT: 71 IN

## 2025-02-12 DIAGNOSIS — E03.9 ACQUIRED HYPOTHYROIDISM: ICD-10-CM

## 2025-02-12 DIAGNOSIS — I10 ESSENTIAL HYPERTENSION, BENIGN: Primary | ICD-10-CM

## 2025-02-12 DIAGNOSIS — Z12.5 SCREENING FOR PROSTATE CANCER: ICD-10-CM

## 2025-02-12 DIAGNOSIS — Z79.899 ENCOUNTER FOR LONG-TERM (CURRENT) USE OF MEDICATIONS: ICD-10-CM

## 2025-02-12 DIAGNOSIS — E78.5 DYSLIPIDEMIA: ICD-10-CM

## 2025-02-12 PROCEDURE — 99214 OFFICE O/P EST MOD 30 MIN: CPT | Performed by: FAMILY MEDICINE

## 2025-02-12 NOTE — ASSESSMENT & PLAN NOTE
Patient has hypothyroidism.  He will continue Synthroid 150 mcg daily.  TSH and T4 have been ordered.    Orders:    TSH, 3rd generation; Future    T4; Future

## 2025-02-12 NOTE — ASSESSMENT & PLAN NOTE
Blood pressure is under very good control.  I rechecked his blood pressure myself and found it to be 124/76.  Patient will continue lisinopril 20 mg daily.  Fasting labs were ordered.    Orders:    Comprehensive metabolic panel; Future

## 2025-02-12 NOTE — ASSESSMENT & PLAN NOTE
Patient's goal LDL cholesterol is less than 100.  A fasting lipid panel was ordered.  His ACC/AHA cardiovascular risk is calculated at the 10.3%.  Therefore, it is imperative that we maintain a normal cholesterol with him.    Orders:    Lipid Panel with Direct LDL reflex; Future

## 2025-02-12 NOTE — PROGRESS NOTES
"Name: Serge Joy      : 1960      MRN: [id number]  Encounter Provider: Pradip Card DO  Encounter Date: 2025   Encounter department: [facility]  :  Assessment & Plan  Essential hypertension, benign  Blood pressure is under very good control.  I rechecked his blood pressure myself and found it to be 124/76.  Patient will continue lisinopril 20 mg daily.  Fasting labs were ordered.    Orders:    Comprehensive metabolic panel; Future    Acquired hypothyroidism  Patient has hypothyroidism.  He will continue Synthroid 150 mcg daily.  TSH and T4 have been ordered.    Orders:    TSH, 3rd generation; Future    T4; Future    Dyslipidemia  Patient's goal LDL cholesterol is less than 100.  A fasting lipid panel was ordered.  His ACC/AHA cardiovascular risk is calculated at the 10.3%.  Therefore, it is imperative that we maintain a normal cholesterol with him.    Orders:    Lipid Panel with Direct LDL reflex; Future    Screening for prostate cancer    Orders:    PSA, Total Screen; Future    Encounter for long-term (current) use of medications    Orders:    CBC and differential; Future           History of Present Illness   Patient is a 64-year-old white male who presents to the office today for his routine checkup.  Patient is doing well and has no complaints.  He is not exercising.  He did gain some weight since his last visit.  He reports compliance with his medicine and he feels well in general.      Review of Systems   HENT:  Positive for hearing loss.    Eyes:         Positive for floaters   Cardiovascular:  Negative for chest pain, palpitations and leg swelling.   Gastrointestinal:  Negative for abdominal distention, abdominal pain, blood in stool, constipation and diarrhea.   Genitourinary:         Denies nocturia and weak urinary stream       Objective   /83   Pulse (!) 53   Temp 99.3 °F (37.4 °C)   Ht 5' 10.5\" (1.791 m)   Wt 108 kg (238 lb 12.8 oz)   SpO2 97%   " BMI 33.78 kg/m²      Physical Exam  Vitals reviewed.   Constitutional:       Comments: This is a 64-year-old white male who appears his stated age.  The patient is pleasant, cooperative, and in no distress.   HENT:      Head: Normocephalic and atraumatic.      Right Ear: External ear normal. There is impacted cerumen.      Left Ear: External ear normal. There is impacted cerumen.      Mouth/Throat:      Mouth: Mucous membranes are moist.      Pharynx: Oropharynx is clear. No oropharyngeal exudate or posterior oropharyngeal erythema.   Eyes:      General: No scleral icterus.        Right eye: No discharge.         Left eye: No discharge.      Conjunctiva/sclera: Conjunctivae normal.      Pupils: Pupils are equal, round, and reactive to light.   Neck:      Comments: There is no thyromegaly  Cardiovascular:      Rate and Rhythm: Normal rate and regular rhythm.      Heart sounds: Normal heart sounds. No murmur heard.     No friction rub. No gallop.   Pulmonary:      Effort: Pulmonary effort is normal. No respiratory distress.      Breath sounds: Normal breath sounds. No stridor. No wheezing, rhonchi or rales.   Abdominal:      General: Bowel sounds are normal. There is no distension.      Palpations: Abdomen is soft.      Tenderness: There is no abdominal tenderness. There is no guarding.      Comments: There is no organomegaly   Musculoskeletal:      Cervical back: Neck supple.   Lymphadenopathy:      Cervical: No cervical adenopathy.   Psychiatric:         Mood and Affect: Mood normal.         Behavior: Behavior normal.         Thought Content: Thought content normal.         Judgment: Judgment normal.     Extremities: Without cyanosis, clubbing, or edema

## 2025-02-24 DIAGNOSIS — I10 ESSENTIAL HYPERTENSION, BENIGN: ICD-10-CM

## 2025-02-24 RX ORDER — LISINOPRIL 20 MG/1
20 TABLET ORAL DAILY
Qty: 90 TABLET | Refills: 1 | Status: SHIPPED | OUTPATIENT
Start: 2025-02-24

## 2025-03-14 PROBLEM — Z12.5 SCREENING FOR PROSTATE CANCER: Status: RESOLVED | Noted: 2025-02-12 | Resolved: 2025-03-14

## 2025-04-01 ENCOUNTER — APPOINTMENT (OUTPATIENT)
Dept: RADIOLOGY | Facility: CLINIC | Age: 65
End: 2025-04-01

## 2025-04-01 ENCOUNTER — APPOINTMENT (OUTPATIENT)
Dept: PHYSICAL THERAPY | Facility: CLINIC | Age: 65
End: 2025-04-01

## 2025-04-01 ENCOUNTER — OCCMED (OUTPATIENT)
Dept: URGENT CARE | Facility: CLINIC | Age: 65
End: 2025-04-01

## 2025-04-01 DIAGNOSIS — Z02.1 PHYSICAL EXAM, PRE-EMPLOYMENT: Primary | ICD-10-CM

## 2025-04-01 DIAGNOSIS — Z02.1 PRE-EMPLOYMENT EXAMINATION: ICD-10-CM

## 2025-04-01 PROCEDURE — 97530 THERAPEUTIC ACTIVITIES: CPT

## 2025-04-01 PROCEDURE — 71046 X-RAY EXAM CHEST 2 VIEWS: CPT

## 2025-05-06 DIAGNOSIS — I10 ESSENTIAL HYPERTENSION, BENIGN: ICD-10-CM

## 2025-05-06 RX ORDER — LISINOPRIL 20 MG/1
20 TABLET ORAL DAILY
Qty: 90 TABLET | Refills: 1 | Status: SHIPPED | OUTPATIENT
Start: 2025-05-06

## 2025-06-19 DIAGNOSIS — E03.9 ACQUIRED HYPOTHYROIDISM: ICD-10-CM

## 2025-06-19 RX ORDER — LEVOTHYROXINE SODIUM 150 UG/1
150 TABLET ORAL DAILY
Qty: 90 TABLET | Refills: 3 | Status: SHIPPED | OUTPATIENT
Start: 2025-06-19

## 2025-08-14 ENCOUNTER — OFFICE VISIT (OUTPATIENT)
Dept: FAMILY MEDICINE CLINIC | Facility: CLINIC | Age: 65
End: 2025-08-14
Payer: COMMERCIAL

## 2025-08-14 PROBLEM — M76.52 PATELLAR TENDINITIS OF LEFT KNEE: Status: ACTIVE | Noted: 2025-08-14

## 2025-08-16 ENCOUNTER — HOSPITAL ENCOUNTER (OUTPATIENT)
Dept: RADIOLOGY | Facility: HOSPITAL | Age: 65
Discharge: HOME | End: 2025-08-16
Payer: COMMERCIAL

## 2025-08-16 DIAGNOSIS — M76.52 PATELLAR TENDINITIS OF LEFT KNEE: ICD-10-CM

## 2025-08-16 LAB
ALBUMIN SERPL-MCNC: 4.2 G/DL (ref 3.5–5.7)
ALP SERPL-CCNC: 56 U/L (ref 35–120)
ALT SERPL-CCNC: 15 U/L
ANION GAP SERPL CALCULATED.3IONS-SCNC: 7 MMOL/L (ref 3–11)
AST SERPL-CCNC: 16 U/L
BASOPHILS # BLD AUTO: 0.1 THOU/CMM (ref 0–0.1)
BASOPHILS NFR BLD AUTO: 1 %
BILIRUB SERPL-MCNC: 0.5 MG/DL (ref 0.2–1)
BUN SERPL-MCNC: 16 MG/DL (ref 7–28)
CALCIUM SERPL-MCNC: 8.8 MG/DL (ref 8.5–10.5)
CHLORIDE SERPL-SCNC: 105 MMOL/L (ref 100–109)
CHOLEST SERPL-MCNC: 162 MG/DL
CHOLEST/HDLC SERPL: 3.7 {RATIO}
CO2 SERPL-SCNC: 27 MMOL/L (ref 21–31)
CREAT SERPL-MCNC: 0.89 MG/DL (ref 0.53–1.3)
CYTOLOGY CMNT CVX/VAG CYTO-IMP: NORMAL
DIFFERENTIAL METHOD BLD: NORMAL
EOSINOPHIL # BLD AUTO: 0.3 THOU/CMM (ref 0–0.5)
EOSINOPHIL NFR BLD AUTO: 6 %
ERYTHROCYTE [DISTWIDTH] IN BLOOD BY AUTOMATED COUNT: 13.1 % (ref 12–16)
GFR/BSA.PRED SERPLBLD CYS-BASED-ARV: 96 ML/MIN/{1.73_M2}
GLUCOSE SERPL-MCNC: 99 MG/DL (ref 65–99)
HCT VFR BLD AUTO: 40.4 % (ref 37–48)
HDLC SERPL-MCNC: 44 MG/DL (ref 23–92)
HGB BLD-MCNC: 13.7 G/DL (ref 12.5–17)
LDLC SERPL CALC-MCNC: 103 MG/DL
LYMPHOCYTES # BLD AUTO: 1.6 THOU/CMM (ref 1–3)
LYMPHOCYTES NFR BLD AUTO: 26 %
MCH RBC QN AUTO: 31.3 PG (ref 27–36)
MCHC RBC AUTO-ENTMCNC: 34 G/DL (ref 32–37)
MCV RBC AUTO: 92 FL (ref 80–100)
MONOCYTES # BLD AUTO: 0.6 THOU/CMM (ref 0.3–1)
MONOCYTES NFR BLD AUTO: 10 %
NEUTROPHILS # BLD AUTO: 3.6 THOU/CMM (ref 1.8–7.8)
NEUTROPHILS NFR BLD AUTO: 57 %
NONHDLC SERPL-MCNC: 118 MG/DL
PLATELET # BLD AUTO: 219 THOU/CMM (ref 140–350)
PMV BLD REES-ECKER: 8.6 FL (ref 7.5–11.3)
POTASSIUM SERPL-SCNC: 4.9 MMOL/L (ref 3.5–5.2)
PROT SERPL-MCNC: 6.6 G/DL (ref 6.3–8.3)
PSA SERPL-MCNC: 0.44 NG/ML
RBC # BLD AUTO: 4.38 MILL/CMM (ref 4–5.4)
SODIUM SERPL-SCNC: 139 MMOL/L (ref 135–145)
T4 SERPL-MCNC: 8.29 UG/DL (ref 6.09–12.23)
TRIGL SERPL-MCNC: 77 MG/DL
TSH SERPL-ACNC: 3.2 UIU/ML (ref 0.45–5.33)
WBC # BLD AUTO: 6.2 THOU/CMM (ref 4–10.5)

## 2025-08-16 PROCEDURE — 73562 X-RAY EXAM OF KNEE 3: CPT

## 2025-08-20 ENCOUNTER — OFFICE VISIT (OUTPATIENT)
Dept: FAMILY MEDICINE CLINIC | Facility: CLINIC | Age: 65
End: 2025-08-20
Payer: COMMERCIAL

## 2025-08-20 VITALS
BODY MASS INDEX: 31.96 KG/M2 | DIASTOLIC BLOOD PRESSURE: 60 MMHG | OXYGEN SATURATION: 98 % | HEART RATE: 56 BPM | WEIGHT: 228.3 LBS | TEMPERATURE: 98.3 F | SYSTOLIC BLOOD PRESSURE: 124 MMHG | HEIGHT: 71 IN

## 2025-08-20 DIAGNOSIS — Z00.00 ANNUAL PHYSICAL EXAM: Primary | ICD-10-CM

## 2025-08-20 PROCEDURE — 99396 PREV VISIT EST AGE 40-64: CPT | Performed by: FAMILY MEDICINE
